# Patient Record
Sex: FEMALE | Race: WHITE | NOT HISPANIC OR LATINO
[De-identification: names, ages, dates, MRNs, and addresses within clinical notes are randomized per-mention and may not be internally consistent; named-entity substitution may affect disease eponyms.]

---

## 2017-07-17 ENCOUNTER — RX RENEWAL (OUTPATIENT)
Age: 67
End: 2017-07-17

## 2017-11-28 ENCOUNTER — MEDICATION RENEWAL (OUTPATIENT)
Age: 67
End: 2017-11-28

## 2018-01-10 ENCOUNTER — NON-APPOINTMENT (OUTPATIENT)
Age: 68
End: 2018-01-10

## 2018-01-10 ENCOUNTER — APPOINTMENT (OUTPATIENT)
Dept: CARDIOLOGY | Facility: CLINIC | Age: 68
End: 2018-01-10
Payer: MEDICARE

## 2018-01-10 VITALS
HEIGHT: 62 IN | SYSTOLIC BLOOD PRESSURE: 130 MMHG | WEIGHT: 165 LBS | BODY MASS INDEX: 30.36 KG/M2 | DIASTOLIC BLOOD PRESSURE: 74 MMHG | HEART RATE: 96 BPM

## 2018-01-10 DIAGNOSIS — Z82.49 FAMILY HISTORY OF ISCHEMIC HEART DISEASE AND OTHER DISEASES OF THE CIRCULATORY SYSTEM: ICD-10-CM

## 2018-01-10 PROCEDURE — 99213 OFFICE O/P EST LOW 20 MIN: CPT

## 2018-01-10 PROCEDURE — 93000 ELECTROCARDIOGRAM COMPLETE: CPT

## 2018-01-12 LAB
25(OH)D3 SERPL-MCNC: 34.9 NG/ML
BASOPHILS # BLD AUTO: 0.02 K/UL
BASOPHILS NFR BLD AUTO: 0.3 %
EOSINOPHIL # BLD AUTO: 0.13 K/UL
EOSINOPHIL NFR BLD AUTO: 1.9 %
HBA1C MFR BLD HPLC: 5.8 %
HCT VFR BLD CALC: 44 %
HGB BLD-MCNC: 13.7 G/DL
IMM GRANULOCYTES NFR BLD AUTO: 0.1 %
LYMPHOCYTES # BLD AUTO: 1.99 K/UL
LYMPHOCYTES NFR BLD AUTO: 28.8 %
MAN DIFF?: NORMAL
MCHC RBC-ENTMCNC: 26.7 PG
MCHC RBC-ENTMCNC: 31.1 GM/DL
MCV RBC AUTO: 85.8 FL
MONOCYTES # BLD AUTO: 0.51 K/UL
MONOCYTES NFR BLD AUTO: 7.4 %
NEUTROPHILS # BLD AUTO: 4.24 K/UL
NEUTROPHILS NFR BLD AUTO: 61.5 %
PLATELET # BLD AUTO: 180 K/UL
RBC # BLD: 5.13 M/UL
RBC # FLD: 14.8 %
T3FREE SERPL-MCNC: 3.19 PG/ML
T4 FREE SERPL-MCNC: 1.2 NG/DL
TSH SERPL-ACNC: 1.54 UIU/ML
WBC # FLD AUTO: 6.9 K/UL

## 2018-01-16 ENCOUNTER — OTHER (OUTPATIENT)
Age: 68
End: 2018-01-16

## 2018-01-19 ENCOUNTER — APPOINTMENT (OUTPATIENT)
Dept: CARDIOLOGY | Facility: CLINIC | Age: 68
End: 2018-01-19

## 2018-01-22 ENCOUNTER — MEDICATION RENEWAL (OUTPATIENT)
Age: 68
End: 2018-01-22

## 2018-01-22 LAB
ALBUMIN SERPL ELPH-MCNC: 4.5 G/DL
ALP BLD-CCNC: 104 U/L
ALT SERPL-CCNC: 25 U/L
ANION GAP SERPL CALC-SCNC: 14 MMOL/L
AST SERPL-CCNC: 28 U/L
BILIRUB SERPL-MCNC: 0.5 MG/DL
BUN SERPL-MCNC: 16 MG/DL
CALCIUM SERPL-MCNC: 10.7 MG/DL
CHLORIDE SERPL-SCNC: 105 MMOL/L
CHOLEST SERPL-MCNC: 203 MG/DL
CHOLEST/HDLC SERPL: 3.7 RATIO
CO2 SERPL-SCNC: 24 MMOL/L
CREAT SERPL-MCNC: 0.9 MG/DL
CRP SERPL HS-MCNC: 0.6 MG/L
GLUCOSE SERPL-MCNC: 103 MG/DL
HDLC SERPL-MCNC: 55 MG/DL
LDLC SERPL CALC-MCNC: 101 MG/DL
MAGNESIUM SERPL-MCNC: 2.4 MG/DL
POTASSIUM SERPL-SCNC: 4.6 MMOL/L
PROT SERPL-MCNC: 7.5 G/DL
SODIUM SERPL-SCNC: 143 MMOL/L
TRIGL SERPL-MCNC: 237 MG/DL

## 2018-06-07 ENCOUNTER — EMERGENCY (EMERGENCY)
Facility: HOSPITAL | Age: 68
LOS: 1 days | Discharge: HOME | End: 2018-06-07
Attending: EMERGENCY MEDICINE | Admitting: INTERNAL MEDICINE
Payer: MEDICARE

## 2018-06-07 VITALS
TEMPERATURE: 98 F | WEIGHT: 154.98 LBS | DIASTOLIC BLOOD PRESSURE: 103 MMHG | HEIGHT: 62 IN | RESPIRATION RATE: 20 BRPM | SYSTOLIC BLOOD PRESSURE: 159 MMHG | OXYGEN SATURATION: 99 % | HEART RATE: 111 BPM

## 2018-06-07 DIAGNOSIS — E78.5 HYPERLIPIDEMIA, UNSPECIFIED: ICD-10-CM

## 2018-06-07 DIAGNOSIS — R00.0 TACHYCARDIA, UNSPECIFIED: ICD-10-CM

## 2018-06-07 DIAGNOSIS — G45.4 TRANSIENT GLOBAL AMNESIA: ICD-10-CM

## 2018-06-07 DIAGNOSIS — R41.0 DISORIENTATION, UNSPECIFIED: ICD-10-CM

## 2018-06-07 LAB
ALBUMIN SERPL ELPH-MCNC: 4.1 G/DL — SIGNIFICANT CHANGE UP (ref 3.3–5)
ALP SERPL-CCNC: 111 U/L — SIGNIFICANT CHANGE UP (ref 40–120)
ALT FLD-CCNC: 31 U/L — SIGNIFICANT CHANGE UP (ref 12–78)
ANION GAP SERPL CALC-SCNC: 12 MMOL/L — SIGNIFICANT CHANGE UP (ref 5–17)
APPEARANCE UR: CLEAR — SIGNIFICANT CHANGE UP
APTT BLD: 29.1 SEC — SIGNIFICANT CHANGE UP (ref 27.5–37.4)
AST SERPL-CCNC: 26 U/L — SIGNIFICANT CHANGE UP (ref 15–37)
BASOPHILS # BLD AUTO: 0.05 K/UL — SIGNIFICANT CHANGE UP (ref 0–0.2)
BASOPHILS NFR BLD AUTO: 0.5 % — SIGNIFICANT CHANGE UP (ref 0–2)
BILIRUB SERPL-MCNC: 0.4 MG/DL — SIGNIFICANT CHANGE UP (ref 0.2–1.2)
BILIRUB UR-MCNC: NEGATIVE — SIGNIFICANT CHANGE UP
BUN SERPL-MCNC: 14 MG/DL — SIGNIFICANT CHANGE UP (ref 7–23)
CALCIUM SERPL-MCNC: 10.2 MG/DL — HIGH (ref 8.5–10.1)
CHLORIDE SERPL-SCNC: 103 MMOL/L — SIGNIFICANT CHANGE UP (ref 96–108)
CK MB CFR SERPL CALC: 1.6 NG/ML — SIGNIFICANT CHANGE UP (ref 0.5–3.6)
CO2 SERPL-SCNC: 24 MMOL/L — SIGNIFICANT CHANGE UP (ref 22–31)
COLOR SPEC: YELLOW — SIGNIFICANT CHANGE UP
CREAT SERPL-MCNC: 0.91 MG/DL — SIGNIFICANT CHANGE UP (ref 0.5–1.3)
DIFF PNL FLD: ABNORMAL
EOSINOPHIL # BLD AUTO: 0.11 K/UL — SIGNIFICANT CHANGE UP (ref 0–0.5)
EOSINOPHIL NFR BLD AUTO: 1.1 % — SIGNIFICANT CHANGE UP (ref 0–6)
ETHANOL SERPL-MCNC: <10 MG/DL — SIGNIFICANT CHANGE UP (ref 0–10)
GLUCOSE BLDC GLUCOMTR-MCNC: 108 MG/DL — HIGH (ref 70–99)
GLUCOSE SERPL-MCNC: 96 MG/DL — SIGNIFICANT CHANGE UP (ref 70–99)
GLUCOSE UR QL: NEGATIVE MG/DL — SIGNIFICANT CHANGE UP
HCT VFR BLD CALC: 45.4 % — HIGH (ref 34.5–45)
HGB BLD-MCNC: 14.4 G/DL — SIGNIFICANT CHANGE UP (ref 11.5–15.5)
IMM GRANULOCYTES NFR BLD AUTO: 0.3 % — SIGNIFICANT CHANGE UP (ref 0–1.5)
INR BLD: 0.94 RATIO — SIGNIFICANT CHANGE UP (ref 0.88–1.16)
KETONES UR-MCNC: NEGATIVE — SIGNIFICANT CHANGE UP
LEUKOCYTE ESTERASE UR-ACNC: ABNORMAL
LYMPHOCYTES # BLD AUTO: 2.22 K/UL — SIGNIFICANT CHANGE UP (ref 1–3.3)
LYMPHOCYTES # BLD AUTO: 21.5 % — SIGNIFICANT CHANGE UP (ref 13–44)
MCHC RBC-ENTMCNC: 26.3 PG — LOW (ref 27–34)
MCHC RBC-ENTMCNC: 31.7 GM/DL — LOW (ref 32–36)
MCV RBC AUTO: 82.8 FL — SIGNIFICANT CHANGE UP (ref 80–100)
MONOCYTES # BLD AUTO: 0.67 K/UL — SIGNIFICANT CHANGE UP (ref 0–0.9)
MONOCYTES NFR BLD AUTO: 6.5 % — SIGNIFICANT CHANGE UP (ref 2–14)
NEUTROPHILS # BLD AUTO: 7.23 K/UL — SIGNIFICANT CHANGE UP (ref 1.8–7.4)
NEUTROPHILS NFR BLD AUTO: 70.1 % — SIGNIFICANT CHANGE UP (ref 43–77)
NITRITE UR-MCNC: NEGATIVE — SIGNIFICANT CHANGE UP
PCP SPEC-MCNC: SIGNIFICANT CHANGE UP
PH UR: 8 — SIGNIFICANT CHANGE UP (ref 5–8)
PLATELET # BLD AUTO: 242 K/UL — SIGNIFICANT CHANGE UP (ref 150–400)
POTASSIUM SERPL-MCNC: 4.3 MMOL/L — SIGNIFICANT CHANGE UP (ref 3.5–5.3)
POTASSIUM SERPL-SCNC: 4.3 MMOL/L — SIGNIFICANT CHANGE UP (ref 3.5–5.3)
PROT SERPL-MCNC: 8.3 GM/DL — SIGNIFICANT CHANGE UP (ref 6–8.3)
PROT UR-MCNC: NEGATIVE MG/DL — SIGNIFICANT CHANGE UP
PROTHROM AB SERPL-ACNC: 10.2 SEC — SIGNIFICANT CHANGE UP (ref 9.8–12.7)
RBC # BLD: 5.48 M/UL — HIGH (ref 3.8–5.2)
RBC # FLD: 14.4 % — SIGNIFICANT CHANGE UP (ref 10.3–14.5)
SODIUM SERPL-SCNC: 139 MMOL/L — SIGNIFICANT CHANGE UP (ref 135–145)
SP GR SPEC: 1.01 — SIGNIFICANT CHANGE UP (ref 1.01–1.02)
TROPONIN I SERPL-MCNC: <.015 NG/ML — SIGNIFICANT CHANGE UP (ref 0.01–0.04)
UROBILINOGEN FLD QL: NEGATIVE MG/DL — SIGNIFICANT CHANGE UP
WBC # BLD: 10.31 K/UL — SIGNIFICANT CHANGE UP (ref 3.8–10.5)
WBC # FLD AUTO: 10.31 K/UL — SIGNIFICANT CHANGE UP (ref 3.8–10.5)

## 2018-06-07 PROCEDURE — 70450 CT HEAD/BRAIN W/O DYE: CPT | Mod: 26

## 2018-06-07 PROCEDURE — 93880 EXTRACRANIAL BILAT STUDY: CPT | Mod: 26

## 2018-06-07 PROCEDURE — G0426: CPT

## 2018-06-07 PROCEDURE — 99285 EMERGENCY DEPT VISIT HI MDM: CPT

## 2018-06-07 PROCEDURE — 71045 X-RAY EXAM CHEST 1 VIEW: CPT | Mod: 26

## 2018-06-07 PROCEDURE — 99283 EMERGENCY DEPT VISIT LOW MDM: CPT

## 2018-06-07 RX ORDER — METOPROLOL TARTRATE 50 MG
25 TABLET ORAL
Qty: 0 | Refills: 0 | Status: DISCONTINUED | OUTPATIENT
Start: 2018-06-07 | End: 2018-06-08

## 2018-06-07 RX ORDER — ATORVASTATIN CALCIUM 80 MG/1
80 TABLET, FILM COATED ORAL AT BEDTIME
Qty: 0 | Refills: 0 | Status: DISCONTINUED | OUTPATIENT
Start: 2018-06-07 | End: 2018-06-08

## 2018-06-07 RX ORDER — ASPIRIN/CALCIUM CARB/MAGNESIUM 324 MG
81 TABLET ORAL DAILY
Qty: 0 | Refills: 0 | Status: DISCONTINUED | OUTPATIENT
Start: 2018-06-07 | End: 2018-06-08

## 2018-06-07 RX ORDER — FLUTICASONE PROPIONATE 50 MCG
1 SPRAY, SUSPENSION NASAL
Qty: 0 | Refills: 0 | Status: DISCONTINUED | OUTPATIENT
Start: 2018-06-07 | End: 2018-06-08

## 2018-06-07 RX ADMIN — Medication 25 MILLIGRAM(S): at 22:11

## 2018-06-07 RX ADMIN — Medication 81 MILLIGRAM(S): at 23:50

## 2018-06-07 RX ADMIN — Medication 1 SPRAY(S): at 22:08

## 2018-06-07 RX ADMIN — ATORVASTATIN CALCIUM 80 MILLIGRAM(S): 80 TABLET, FILM COATED ORAL at 23:50

## 2018-06-07 NOTE — H&P ADULT - ASSESSMENT
64F WITH hyperlipidemia with transient global amnesia     IMPROVE VTE Individual Risk Assessment        RISK                                                          Points  [  ] Previous VTE                                                3  [  ] Thrombophilia                                             2  [  ] Lower limb paralysis                                   2        (unable to hold up >15 seconds)    [  ] Current Cancer                                            2         (within 6 months)  [  ] Immobilization > 24 hrs                              1  [  ] ICU/CCU stay > 24 hours                            1  [  ] Age > 60                                                    1  IMPROVE VTE Score _1________

## 2018-06-07 NOTE — H&P ADULT - NSHPLABSRESULTS_GEN_ALL_CORE
14.4   10.31 )-----------( 242      ( 07 Jun 2018 17:31 )             45.4   06-07    139  |  103  |  14  ----------------------------<  96  4.3   |  24  |  0.91    Ca    10.2<H>      07 Jun 2018 17:31    TPro  8.3  /  Alb  4.1  /  TBili  0.4  /  DBili  x   /  AST  26  /  ALT  31  /  AlkPhos  111  06-07  < from: CT Brain Stroke Protocol (06.07.18 @ 17:14) >    Results were discussed with Dr. Thomas at 5:20 PM on June 7, 2018.  No acute intracranial hemorrhage or acute territorial infarct.

## 2018-06-07 NOTE — H&P ADULT - NSHPPHYSICALEXAM_GEN_ALL_CORE
GENERAL: NAD well-developed  HEAD:  Atraumatic, Normocephalic  EYES: EOMI, PERRLA, conjunctiva and sclera clear  ENMT: No tonsillar erythema, exudates, or enlargement; Moist mucous membranes, Good dentition, No lesions  NECK: Supple, No JVD, Normal thyroid  NERVOUS SYSTEM:  Alert & Oriented X3, Good concentration; Motor Strength 5/5 B/L upper and lower extremities; DTRs 2+ intact and symmetric  CHEST/LUNG: Clear to percussion bilaterally; No rales, rhonchi, wheezing, or rubs  HEART: Regular rate and rhythm; No murmurs, rubs, or gallops  ABDOMEN: Soft, Nontender, Nondistended; Bowel sounds present  EXTREMITIES:  2+ Peripheral Pulses, No clubbing, cyanosis, or edema  LYMPH: No lymphadenopathy   SKIN: No rashes or lesions GENERAL: NAD well-developed  HEAD:  Atraumatic, Normocephalic  EYES: EOMI, PERRLA, conjunctiva and sclera clear  ENMT: No tonsillar erythema, exudates, or enlargement; Moist mucous membranes, Good dentition, No lesions  NECK: Supple, No JVD, Normal thyroid  NERVOUS SYSTEM:  Alert & Oriented X2, Good concentration; Motor Strength 5/5 B/L upper and lower extremities; DTRs 2+ intact and symmetric DID NOT KNOW PRESIDENT OR YEAR BUT KNEW DISTANT MEMORY AND MATH   CHEST/LUNG: Clear to percussion bilaterally; No rales, rhonchi, wheezing, or rubs  HEART: Regular rate and rhythm; No murmurs, rubs, or gallops  ABDOMEN: Soft, Nontender, Nondistended; Bowel sounds present  EXTREMITIES:  2+ Peripheral Pulses, No clubbing, cyanosis, or edema  LYMPH: No lymphadenopathy   SKIN: No rashes or lesions

## 2018-06-07 NOTE — ED PROVIDER NOTE - MEDICAL DECISION MAKING DETAILS
sudden disorientation/confusion, answering many questions right, but disoriented to time questions. otherwise appropriate and joking, fs wnl, no other neuro symptoms. stroke code called given sudden onset and some unclear neuro symptoms, ct brain, neuro eval, labs.

## 2018-06-07 NOTE — ED ADULT NURSE NOTE - OBJECTIVE STATEMENT
Pt A&Ox 2, Per family pt became suddenly confused while trimming trees in the backward around 3 p.m. Pt denies any chest pain or headache while in ED. Pt moving all extremities no drifting or weakness noted. No slurred speech or facial droop noted while in ED. Pt evaluated by Dr. NOA Bullock via telestroke

## 2018-06-07 NOTE — CONSULT NOTE ADULT - ASSESSMENT
Historian:     Patient and .  HPI:    LAURA MORA ..56 y/o female hx hld biba for sudden onset confusion starting ~230-2:45p. Seen normal at 2:30, seen again by  around 2:45p and pt kept repeating self, didn't know the year, or what was happening in some situations. Per ems, pt wasn't able to answer questions correctly at all, including orientation to place/person, but has improved somewhat. Fs on scene 105, no trauma, no headache, no weakness/tingling/slurred speech, no a/c. Denies recent illness, cp, sob, abd pain or any other symptoms. (2018 18:29).   stated that she could not remember an event this morning she was trimming hedges and still cannot recall she did up to the present.    PAST MEDICAL & SURGICAL HISTORY:   hyperlipidemia; No significant past surgical history    MEDICATIONS  (STANDING):  aspirin enteric coated 81 milliGRAM(s) Oral daily  atorvastatin 80 milliGRAM(s) Oral at bedtime  fluticasone propionate 50 MICROgram(s)/spray Nasal Spray 1 Spray(s) Both Nostrils two times a day  metoprolol tartrate 25 milliGRAM(s) Oral two times a day    MEDICATIONS  (PRN):  Allergies: No Known Allergies  Intolerances  FAMILY HISTORY:  Family history of cerebrovascular accident (CVA) (Father)    Vital Signs Last 24 Hrs  T(C): 37 (2018 21:14), Max: 37.1 (2018 19:56)  T(F): 98.6 (2018 21:14), Max: 98.7 (2018 19:56)  HR: 116 (2018 21:14) (111 - 126)  BP: 135/89 (2018 21:14) (135/89 - 159/103)  BP(mean): --  RR: 18 (2018 21:14) (18 - 20)  SpO2: 97% (2018 21:14) (97% - 99%)    NEUROLOGICAL EXAM:  HENT:  Normocephalic head; atraumatic head.  Neck supple.  ENT: normal looking.  Mental State:    Alert.  Fully oriented to person, place and date.  Coherent.  Speech clear and intact.  Cooperative.  Responds appropriately.  Still unable to recall what she did at home (trimming the hedges in the garden).    Cranial Nerves:  II-XII:   Pupils round and reactive to light and accommodation.  Extraocular movements full.  Visual fields full (no homonymous hemianopsia).  Visual acuity wnl.  Facial symmetry intact.  Tongue midline.  Motor Functions:  Moves all extremities.  No pronator drift of UE.  Claps hands well.  Hand  intact bilaterally.      Sensory Functions:   Intact to touch and pinprick to face and extremities.  Reflexes:  Deep tendon reflexes normoactive to biceps, knees and ankles.  Babinski absent (present).  Cerebellar Testing:    Finger to nose intact.  Nystagmus absent.  Neurovascular: Carotid auscultation full without bruits.      LABS:                        14.4   10. )-----------( 242      ( 2018 17:31 )             45.4         139  |  103  |  14  ----------------------------<  96  4.3   |  24  |  0.91    Ca    10.2<H>      2018 17:31    TPro  8.3  /  Alb  4.1  /  TBili  0.4  /  DBili  x   /  AST  26  /  ALT  31  /  AlkPhos  111      PT/INR - ( 2018 17:31 )   PT: 10.2 sec;   INR: 0.94 ratio         PTT - ( 2018 17:31 )  PTT:29.1 sec    Urinalysis Basic - ( 2018 20:42 )    Color: Yellow / Appearance: Clear / S.010 / pH: x  Gluc: x / Ketone: Negative  / Bili: Negative / Urobili: Negative mg/dL   Blood: x / Protein: Negative mg/dL / Nitrite: Negative   Leuk Esterase: Trace / RBC: 0-2 /HPF / WBC 0-2   Sq Epi: x / Non Sq Epi: Occasional / Bacteria: Occasional    RADIOLOGY & ADDITIONAL STUDIES:    CT Brain Stroke Protocol: Urgent   Indication: sudden confusion, r/o stroke  Transport: Stretcher-Crib  Exam Completed  Provider's Contact #: [phone] ( @ 17:01)  POCT  Blood Glucose: 17:00 ( @ 17:03)  Complete Blood Count + Automated Diff: STAT ( @ 17:06)  Comprehensive Metabolic Panel: STAT ( @ 17:06)  Troponin I, Serum: STAT ( @ 17:06)  CKMB Mass Assay: STAT ( @ 17:06)  12 Lead ECG:   Provider's Contact #: [phone] ( @ 17:06)  Cardiac Monitor Bedside:     Time/Priority:  STAT ( @ 17:06)  Prothrombin Time and INR, Plasma:  Start Date:2018. STAT ( @ 17:06)  Activated Partial Thromboplastin Time:  Start Date:2018. STAT ( @ 17:06)  Xray Chest 1 View- PORTABLE-Urgent: Urgent   Indication: sudden disorientation  Transport: Portable  Exam Completed  Provider's Contact #: [phone] ( @ 17:06)  Type + Screen: Routine ( @ 17:06)  ABO Rh Confirmatory Specimen: Routine  Addl Info: Conditional: ABO Rh Confirmatory Specimen ( @ 17:06)  Urinalysis: STAT ( @ 17:06)  Culture - Urine: Routine  Specimen Source: Clean Catch (Midstream) ( @ 17:06)  Drug Screen W/PCP, Urine: STAT ( @ 17:15)  Alcohol, Blood: STAT  Cancel Reason: Lab Reord. ( @ 17:15)  Nucleated RBC: 17:25 ( @ 17:32)  Antibody Screen: 17:25 ( @ 17:32)  Alcohol, Blood: 17:25 ( @ 17:33)  Admit to Inpatient Level of Care.:     Service:  MEDICAL/SURGICAL    Physician:  Flavia Leblanc    Additional Instructions:  Diagnosis: Confusion  Isolation: None  Special Consideration: None ( @ 18:00)  Admit from ED ( @ 18:18)  atorvastatin: [Known as LIPITOR]  80 milliGRAM(s), Oral, at bedtime ( @ 18:30)  Admit to Inpatient Level of Care.:     Service:  Telemetry    Physician:  paul ( @ 18:45)  National Institutes [employer] Stroke Scale Score:     Time/Priority:  Routine    Additional Instructions:  Type score here ___________ ( @ 18:45)  VTE Prophylaxis - Low Risk No Prophylaxis Required:     Time/Priority:  Routine ( 18:45)  Dysphagia Screening:     Time/Priority:  Routine ( 18:45)  Supervise Meals:    Indications:    Dysphagia   Additional Instructions:  Once dysphagia screen passed, contact provider to reevaluate diet order ( 18:45)  Assess Neurological Status:     Type of Neuro Check:  Stroke    Frequency:  per routine    Additional Instructions:  Perform stroke neurological check ( 18:45)  Notify Provider For:     Additional Instructions:  Temperature GREATER THAN 38.3C (101.0F) or LESS THAN 36.0C (96.8F) ( 18:45)  Notify Provider For:     Additional Instructions:  Decline or change in neurological status ( 18:45)  Elevate - Head of Bed 30 Degrees:     Frequency:  <Continuous> ( 18:45)  Activity - Out of Bed with Assistance ( 18:45)  Fall Risk Protocol:     Time/Priority:  Routine    Additional Instructions:  Follow fall risk protocol ( 18:45)  Aspiration Precautions:     Time/Priority:  Routine ( 18:45)  Remote Telemetry/EKG EXCEPT Off Unit Tests:     Time/Priority:  Routine ( 18:45)  Education:     Other: Stroke    Additional Instructions: Distribute Stroke Education material and provide stroke education ( 18:45)  Lipid Profile: AM Sched. Collection: 2018 05:00  Addl Info: Fasting ( 18:45)  Hemoglobin A1C, Whole Blood: Routine ( 18:45)  Hemoglobin A1C with Mean Plasma Glucose: Routine ( 18:45)  Consult- Social Work, Adult ( 18:45)  Vital Signs:     Frequency:  per routine ( 18:48)  Pulse Oximetry:   Frequency: <Continuous>    Additional Instructions:  Notify Provider if oxygen saturation is LESS THAN 92% ( 18:48)  Remote Telemetry/EKG EXCEPT Off Unit Tests:     Time/Priority:  STAT ( @ 18:48)  Diet, DASH/TLC:   Sodium & Cholesterol Restricted ( 18:48)  Activity - Ambulate as Tolerated:     Time/Priority:  Routine ( @ 18:48)  MR Head No Cont: Routine   Indication: amnesia  Transport: Stretcher-Crib  Provider's Contact #: [phone] ( @ 18:50)  TTE Echo Doppler w/o Cont:   Transport: Stretcher-Crib  Monitor: w/o Monitor  Provider's Contact #: [phone] ( @ 18:50)  US Duplex Carotid Arteries Complete, Bilateral: Routine   Indication: cva  Transport: Stretcher-Crib  Exam Completed  Provider's Contact #: [phone] ( @ 18:50)  aspirin enteric coated: [Known as Ecotrin]  81 milliGRAM(s), Oral, daily  Administration Instructions: swallow whole * don't crush/chew  Provider's Contact #: [phone] ( @ 18:50)  Triiodothyronine, Total (T3 Total): AM Sched. Collection: 2018 05:00 ( @ 18:50)  T4, Serum: AM Sched. Collection: 2018 05:00 ( @ 18:50)  Thyroid Stimulating Hormone, Serum: AM Sched. Collection: 2018 05:00 ( @ 18:50)  metoprolol tartrate: [Ordered as LOPRESSOR]  25 milliGRAM(s), Oral, two times a day  Special Instructions: hsbp<110 p<60  Provider's Contact #: [phone] ( @ 18:51)  EEG Awake or Drowsy:   Transport: Stretcher-Crib  Hemorrhage:No  Brain Death: No  MI:No  Sickle Cell:No   Stimulants:No  Anti-Convulsants:No   Anti-Depressants:No  Contr Substances:No   Ordering Provider's Pager/Contact #[phone] ( @ 19:00)  Benzodiazepine, Urine: 19:00 ( @ 20:42)  THC, Urine Qualitative: 19:00 ( @ 20:42)  Cocaine Metabolite, Urine: 19:00 ( @ 20:42)  Amphetamine, Urine: 19:00 ( @ 20:42)  Opiate, Urine: 19:00 ( @ 20:42)  Barbiturates Screen, Urine: 19:00 ( @ 20:42)  Phencyclidine Level, Urine: 19:00 ( @ 20:42)  Methadone, Urine: 19:00 ( @ 20:43)  Urine Microscopic-Add On (NC): 19:00 ( @ 20:54)  Diet, DASH/TLC:   Sodium & Cholesterol Restricted  Lacto-Ovo Veg (Accepts Milk Prod., Eggs) ( @ 21:18)  fluticasone propionate 50 MICROgram(s)/spray Nasal Spray: [Ordered as FLONASE]  1 Spray(s), Both Nostrils, two times a day  Provider's Contact #: [phone] ( @ 21:18)    Assessment & Opinion:  Transient Global Amnesia.    Recommendations:  Brain MRI.  Carotid doppler.  Echocardiogram.  EEG.   DVT prophylaxis as ordered.  Medications:  Continue all medications

## 2018-06-07 NOTE — ED PROVIDER NOTE - OBJECTIVE STATEMENT
66 y/o female hx hld biba for sudden onset confusion starting ~230-2:45p. Seen normal at 2:30, seen again by  around 2:45p and pt kept repeating self, didn't know the year, or what was happening in some situations. Per ems, pt wasn't able to answer questions correctly at all, including orientation to place/person, but has improved somewhat. Fs on scene 105, no trauma, no headache, no weakness/tingling/slurred speech, no a/c. Denies recent illness, cp, sob, abd pain or any other symptoms.    ROS: No fever/chills. No eye pain/changes in vision, No ear pain/sore throat/dysphagia, No chest pain/palpitations. No SOB/cough/. No abdominal pain, N/V/D, no black/bloody bm. No dysuria/frequency/discharge, No headache. No Dizziness.    No rashes or breaks in skin. No numbness/tingling/weakness.

## 2018-06-07 NOTE — ED ADULT TRIAGE NOTE - CHIEF COMPLAINT QUOTE
pt BIBA for increased confusion starting at 3pm.  states pt keeps asking the same questions over and over again. as per  last normal 1430. history of dm

## 2018-06-07 NOTE — ED PROVIDER NOTE - PROGRESS NOTE DETAILS
Martha: pt seen by telestroke neurologist, no tpa, feels likely transient global amnesia. family bedside, discussion with stroke attending, family and pt, will admit for mri, observation, hydration. no other deficits.

## 2018-06-07 NOTE — H&P ADULT - HISTORY OF PRESENT ILLNESS
58 y/o female hx hld biba for sudden onset confusion starting ~230-2:45p. Seen normal at 2:30, seen again by  around 2:45p and pt kept repeating self, didn't know the year, or what was happening in some situations. Per ems, pt wasn't able to answer questions correctly at all, including orientation to place/person, but has improved somewhat. Fs on scene 105, no trauma, no headache, no weakness/tingling/slurred speech, no a/c. Denies recent illness, cp, sob, abd pain or any other symptoms.

## 2018-06-07 NOTE — PATIENT PROFILE ADULT. - VISION (WITH CORRECTIVE LENSES IF THE PATIENT USUALLY WEARS THEM):
Normal vision: sees adequately in most situations; can see medication labels, newsprint/distance at home

## 2018-06-07 NOTE — H&P ADULT - FAMILY HISTORY
no nausea/no hematuria/no chills/no diarrhea Father  Still living? Unknown  Family history of cerebrovascular accident (CVA), Age at diagnosis: Age Unknown

## 2018-06-07 NOTE — ED PROVIDER NOTE - PHYSICAL EXAMINATION
NIHSS: 2 (month and age wrong)  Gen: No acute distress, non toxic  HEENT: Mucous membranes moist, pink conjunctivae, EOMI  CV: RRR, nl s1/s2.  Resp: CTAB, normal rate and effort  GI: Abdomen soft, NT, ND. No rebound, no guarding  : No CVAT  Neuro: A&O x 2, moving all 4 extremities. 5/5 strength b/l UE and LE, normal sensation. no facial droop, normal speech. normal visual fields.   MSK: No spine or joint tenderness to palpation  Skin: No rashes. intact and perfused.

## 2018-06-08 ENCOUNTER — TRANSCRIPTION ENCOUNTER (OUTPATIENT)
Age: 68
End: 2018-06-08

## 2018-06-08 VITALS
DIASTOLIC BLOOD PRESSURE: 86 MMHG | TEMPERATURE: 98 F | OXYGEN SATURATION: 97 % | HEART RATE: 91 BPM | SYSTOLIC BLOOD PRESSURE: 119 MMHG | RESPIRATION RATE: 16 BRPM

## 2018-06-08 LAB
CHOLEST SERPL-MCNC: 204 MG/DL — HIGH (ref 10–199)
CULTURE RESULTS: NO GROWTH — SIGNIFICANT CHANGE UP
ESTIMATED AVERAGE GLUCOSE: 128 MG/DL — HIGH (ref 68–114)
HBA1C BLD-MCNC: 6.1 % — HIGH (ref 4–5.6)
HBA1C BLD-MCNC: 6.1 % — HIGH (ref 4–5.6)
HDLC SERPL-MCNC: 53 MG/DL — SIGNIFICANT CHANGE UP (ref 40–125)
LIPID PNL WITH DIRECT LDL SERPL: 112 MG/DL — SIGNIFICANT CHANGE UP
SPECIMEN SOURCE: SIGNIFICANT CHANGE UP
T3 SERPL-MCNC: 126 NG/DL — SIGNIFICANT CHANGE UP (ref 80–200)
T4 AB SER-ACNC: 7.5 UG/DL — SIGNIFICANT CHANGE UP (ref 4.6–12)
TOTAL CHOLESTEROL/HDL RATIO MEASUREMENT: 3.8 RATIO — SIGNIFICANT CHANGE UP (ref 3.3–7.1)
TRIGL SERPL-MCNC: 195 MG/DL — HIGH (ref 10–149)
TSH SERPL-MCNC: 1.52 UU/ML — SIGNIFICANT CHANGE UP (ref 0.36–3.74)

## 2018-06-08 PROCEDURE — 99239 HOSP IP/OBS DSCHRG MGMT >30: CPT

## 2018-06-08 PROCEDURE — 70551 MRI BRAIN STEM W/O DYE: CPT | Mod: 26

## 2018-06-08 PROCEDURE — 93306 TTE W/DOPPLER COMPLETE: CPT | Mod: 26

## 2018-06-08 RX ORDER — ALPRAZOLAM 0.25 MG
0.25 TABLET ORAL ONCE
Qty: 0 | Refills: 0 | Status: DISCONTINUED | OUTPATIENT
Start: 2018-06-08 | End: 2018-06-08

## 2018-06-08 RX ORDER — OXYMETAZOLINE HYDROCHLORIDE 0.5 MG/ML
1 SPRAY NASAL ONCE
Qty: 0 | Refills: 0 | Status: COMPLETED | OUTPATIENT
Start: 2018-06-08 | End: 2018-06-08

## 2018-06-08 RX ADMIN — OXYMETAZOLINE HYDROCHLORIDE 1 SPRAY(S): 0.5 SPRAY NASAL at 01:34

## 2018-06-08 RX ADMIN — Medication 81 MILLIGRAM(S): at 11:17

## 2018-06-08 RX ADMIN — Medication 25 MILLIGRAM(S): at 05:51

## 2018-06-08 NOTE — DISCHARGE NOTE ADULT - PATIENT PORTAL LINK FT
You can access the [facility] Patient Portal, offered by [facility], by registering with the following website: [email or web address]

## 2018-06-08 NOTE — DISCHARGE NOTE ADULT - CARE PROVIDER_API CALL
Biju Puentes (MD), Neurology  [address] [place] [address]  Phone: [phone]  Fax: [phone]

## 2018-06-08 NOTE — DISCHARGE NOTE ADULT - HOSPITAL COURSE
56 y/o female hx hld biba for sudden onset confusion starting ~230-2:45p. Seen normal at 2:30, seen again by  around 2:45p and pt kept repeating self, didn't know the year, or what was happening in some situations. Per ems, pt wasn't able to answer questions correctly at all, including orientation to place/person, but has improved somewhat. Fs on scene 105, no trauma, no headache, no weakness/tingling/slurred speech, no a/c. Denies recent illness, cp, sob, abd pain or any other symptoms.     Problem/Plan - 1:  ·  Problem: Transient global amnesia.  Plan: mri normal all symptoms resolved eeg/tte results as outpt  neurology outpt f/u       45min spent on dc planning

## 2018-06-08 NOTE — DISCHARGE NOTE ADULT - MEDICATION SUMMARY - MEDICATIONS TO TAKE
I will START or STAY ON the medications listed below when I get home from the hospital:    Crestor 20 mg oral tablet  -- 1 tab(s) by mouth once a day (at bedtime)  -- Indication: For Dyslipidemia

## 2018-06-08 NOTE — DISCHARGE NOTE ADULT - CARE PLAN
Principal Discharge DX:	Transient global amnesia  Goal:	f/u with neurology  Assessment and plan of treatment:	stay hydrated  Secondary Diagnosis:	Dyslipidemia

## 2018-06-28 ENCOUNTER — APPOINTMENT (OUTPATIENT)
Dept: CARDIOLOGY | Facility: CLINIC | Age: 68
End: 2018-06-28
Payer: MEDICARE

## 2018-06-28 VITALS
BODY MASS INDEX: 30.73 KG/M2 | OXYGEN SATURATION: 98 % | SYSTOLIC BLOOD PRESSURE: 130 MMHG | DIASTOLIC BLOOD PRESSURE: 70 MMHG | WEIGHT: 167 LBS | HEIGHT: 62 IN | HEART RATE: 105 BPM

## 2018-06-28 PROCEDURE — 93000 ELECTROCARDIOGRAM COMPLETE: CPT

## 2018-06-28 PROCEDURE — 99215 OFFICE O/P EST HI 40 MIN: CPT

## 2018-06-28 RX ORDER — DESONIDE 0.5 MG/G
0.05 OINTMENT TOPICAL
Qty: 15 | Refills: 0 | Status: DISCONTINUED | COMMUNITY
Start: 2017-07-18 | End: 2018-06-28

## 2018-07-05 ENCOUNTER — TRANSCRIPTION ENCOUNTER (OUTPATIENT)
Age: 68
End: 2018-07-05

## 2018-07-05 NOTE — PHARMACY COMMUNICATION NOTE - REASON FOR NOTE
lvm to call office I need to know if she is still having symptoms. Also, if so she needs to come in for a nurse visit urine check.  There is no co pay for a nurse visit s/w pa, pt takes at home and wants it now,

## 2018-09-20 ENCOUNTER — RX RENEWAL (OUTPATIENT)
Age: 68
End: 2018-09-20

## 2019-01-09 ENCOUNTER — APPOINTMENT (OUTPATIENT)
Dept: CARDIOLOGY | Facility: CLINIC | Age: 69
End: 2019-01-09

## 2019-02-21 ENCOUNTER — MEDICATION RENEWAL (OUTPATIENT)
Age: 69
End: 2019-02-21

## 2019-03-07 ENCOUNTER — APPOINTMENT (OUTPATIENT)
Dept: CARDIOLOGY | Facility: CLINIC | Age: 69
End: 2019-03-07
Payer: MEDICARE

## 2019-03-07 ENCOUNTER — NON-APPOINTMENT (OUTPATIENT)
Age: 69
End: 2019-03-07

## 2019-03-07 VITALS
SYSTOLIC BLOOD PRESSURE: 120 MMHG | HEIGHT: 62 IN | BODY MASS INDEX: 31.1 KG/M2 | WEIGHT: 169 LBS | DIASTOLIC BLOOD PRESSURE: 82 MMHG

## 2019-03-07 PROCEDURE — 93000 ELECTROCARDIOGRAM COMPLETE: CPT

## 2019-03-07 PROCEDURE — 99214 OFFICE O/P EST MOD 30 MIN: CPT

## 2019-03-07 NOTE — PHYSICAL EXAM
[General Appearance - Well Developed] : well developed [Normal Appearance] : normal appearance [Well Groomed] : well groomed [General Appearance - Well Nourished] : well nourished [No Deformities] : no deformities [General Appearance - In No Acute Distress] : no acute distress [Normal Conjunctiva] : the conjunctiva exhibited no abnormalities [Eyelids - No Xanthelasma] : the eyelids demonstrated no xanthelasmas [Normal Oral Mucosa] : normal oral mucosa [No Oral Pallor] : no oral pallor [No Oral Cyanosis] : no oral cyanosis [Normal Jugular Venous A Waves Present] : normal jugular venous A waves present [Normal Jugular Venous V Waves Present] : normal jugular venous V waves present [No Jugular Venous Hector A Waves] : no jugular venous hector A waves [Respiration, Rhythm And Depth] : normal respiratory rhythm and effort [Exaggerated Use Of Accessory Muscles For Inspiration] : no accessory muscle use [Auscultation Breath Sounds / Voice Sounds] : lungs were clear to auscultation bilaterally [Heart Rate And Rhythm] : heart rate and rhythm were normal [Heart Sounds] : normal S1 and S2 [Murmurs] : no murmurs present [Abdomen Soft] : soft [Abdomen Tenderness] : non-tender [Abdomen Mass (___ Cm)] : no abdominal mass palpated [Abnormal Walk] : normal gait [Gait - Sufficient For Exercise Testing] : the gait was sufficient for exercise testing [Nail Clubbing] : no clubbing of the fingernails [Cyanosis, Localized] : no localized cyanosis [Petechial Hemorrhages (___cm)] : no petechial hemorrhages [Skin Color & Pigmentation] : normal skin color and pigmentation [] : no rash [No Venous Stasis] : no venous stasis [Skin Lesions] : no skin lesions [No Skin Ulcers] : no skin ulcer [No Xanthoma] : no  xanthoma was observed [Oriented To Time, Place, And Person] : oriented to person, place, and time [Affect] : the affect was normal [Mood] : the mood was normal [No Anxiety] : not feeling anxious

## 2019-03-07 NOTE — REASON FOR VISIT
[Follow-Up - Clinic] : a clinic follow-up of [Hyperlipidemia] : hyperlipidemia [FreeTextEntry1] : Patient hospitalized in June, 2018 with episode of "transient global amnesia ".  Patient had selective amnesia lasted several hours, was seen in the emergency room without any evidence of acute stroke. Patient has been asymptomatic ever since. Evaluation in hospital demonstrated normal MRI and echocardiogram, mild to moderate left internal carotid artery stenosis was noted. She has had no interval complaints of chest pain, shortness of breath or palpitations and denies any fatigue, nausea, weight loss, focal neurological deficits. There have been no interval changes in her medications. She is a vegetarian and takes supplements for diet.\par \par Seen by Dr. Andino for TGA, no evidence on MRI of stroke, EEG unremarkable, no careotid stenosis.

## 2019-03-07 NOTE — DISCUSSION/SUMMARY
[Hyperlipidemia] : hyperlipidemia [Stable] : stable [Lipids Test Panel] : a fasting lipid profile [None] : none [___ Month(s)] : [unfilled] month(s) [FreeTextEntry1] : Patient with TGA but no evidence of CVA.\par \par Repeat labs, reassess hyperlipidemia.\par A heart healthy diet and lifestyle was recommended including low-fat, low-cholesterol, low-salt foods with proper weight maintenance and better carbohydrate choices.\par \par

## 2019-03-26 LAB
25(OH)D3 SERPL-MCNC: 30.5 NG/ML
ALBUMIN SERPL ELPH-MCNC: 4.9 G/DL
ALP BLD-CCNC: 121 U/L
ALT SERPL-CCNC: 28 U/L
ANION GAP SERPL CALC-SCNC: 20 MMOL/L
AST SERPL-CCNC: 26 U/L
BASOPHILS # BLD AUTO: 0.04 K/UL
BASOPHILS NFR BLD AUTO: 0.6 %
BILIRUB SERPL-MCNC: 0.3 MG/DL
BUN SERPL-MCNC: 15 MG/DL
CALCIUM SERPL-MCNC: 10.9 MG/DL
CHLORIDE SERPL-SCNC: 104 MMOL/L
CHOLEST SERPL-MCNC: 215 MG/DL
CHOLEST/HDLC SERPL: 4.2 RATIO
CO2 SERPL-SCNC: 17 MMOL/L
CREAT SERPL-MCNC: 0.76 MG/DL
CRP SERPL HS-MCNC: 0.71 MG/L
EOSINOPHIL # BLD AUTO: 0.12 K/UL
EOSINOPHIL NFR BLD AUTO: 1.7 %
GLUCOSE SERPL-MCNC: 98 MG/DL
HBA1C MFR BLD HPLC: 6.1 %
HCT VFR BLD CALC: 47.4 %
HDLC SERPL-MCNC: 51 MG/DL
HGB BLD-MCNC: 14.6 G/DL
IMM GRANULOCYTES NFR BLD AUTO: 0.3 %
LDLC SERPL CALC-MCNC: 102 MG/DL
LYMPHOCYTES # BLD AUTO: 2.13 K/UL
LYMPHOCYTES NFR BLD AUTO: 30.8 %
MAGNESIUM SERPL-MCNC: 2.5 MG/DL
MAN DIFF?: NORMAL
MCHC RBC-ENTMCNC: 26.1 PG
MCHC RBC-ENTMCNC: 30.8 GM/DL
MCV RBC AUTO: 84.8 FL
MONOCYTES # BLD AUTO: 0.47 K/UL
MONOCYTES NFR BLD AUTO: 6.8 %
NEUTROPHILS # BLD AUTO: 4.13 K/UL
NEUTROPHILS NFR BLD AUTO: 59.8 %
PLATELET # BLD AUTO: 147 K/UL
POTASSIUM SERPL-SCNC: 5.9 MMOL/L
PROT SERPL-MCNC: 7.9 G/DL
RBC # BLD: 5.59 M/UL
RBC # FLD: 14.9 %
SODIUM SERPL-SCNC: 141 MMOL/L
T3FREE SERPL-MCNC: 3.52 PG/ML
T4 FREE SERPL-MCNC: 1.2 NG/DL
TRIGL SERPL-MCNC: 309 MG/DL
TSH SERPL-ACNC: 2.18 UIU/ML
WBC # FLD AUTO: 6.91 K/UL

## 2019-07-17 ENCOUNTER — APPOINTMENT (OUTPATIENT)
Dept: CARDIOLOGY | Facility: CLINIC | Age: 69
End: 2019-07-17

## 2019-07-26 LAB
CHOLEST SERPL-MCNC: 161 MG/DL
CHOLEST/HDLC SERPL: 3.7 RATIO
HDLC SERPL-MCNC: 44 MG/DL
LDLC SERPL CALC-MCNC: 85 MG/DL
TRIGL SERPL-MCNC: 160 MG/DL

## 2019-11-05 ENCOUNTER — RX RENEWAL (OUTPATIENT)
Age: 69
End: 2019-11-05

## 2020-03-11 ENCOUNTER — APPOINTMENT (OUTPATIENT)
Dept: CARDIOLOGY | Facility: CLINIC | Age: 70
End: 2020-03-11

## 2020-03-16 ENCOUNTER — RX RENEWAL (OUTPATIENT)
Age: 70
End: 2020-03-16

## 2020-06-25 ENCOUNTER — RX RENEWAL (OUTPATIENT)
Age: 70
End: 2020-06-25

## 2020-09-14 ENCOUNTER — RX RENEWAL (OUTPATIENT)
Age: 70
End: 2020-09-14

## 2020-09-16 ENCOUNTER — APPOINTMENT (OUTPATIENT)
Dept: CARDIOLOGY | Facility: CLINIC | Age: 70
End: 2020-09-16
Payer: MEDICARE

## 2020-09-16 ENCOUNTER — NON-APPOINTMENT (OUTPATIENT)
Age: 70
End: 2020-09-16

## 2020-09-16 VITALS
HEART RATE: 89 BPM | SYSTOLIC BLOOD PRESSURE: 120 MMHG | TEMPERATURE: 98.2 F | WEIGHT: 162 LBS | OXYGEN SATURATION: 99 % | DIASTOLIC BLOOD PRESSURE: 80 MMHG | HEIGHT: 62 IN | BODY MASS INDEX: 29.81 KG/M2

## 2020-09-16 DIAGNOSIS — G45.4 TRANSIENT GLOBAL AMNESIA: ICD-10-CM

## 2020-09-16 PROCEDURE — 93000 ELECTROCARDIOGRAM COMPLETE: CPT

## 2020-09-16 PROCEDURE — 99213 OFFICE O/P EST LOW 20 MIN: CPT

## 2020-09-16 NOTE — REASON FOR VISIT
[Follow-Up - Clinic] : a clinic follow-up of [Hyperlipidemia] : hyperlipidemia [FreeTextEntry1] : Patient hospitalized in June, 2018 with episode of "transient global amnesia ".  Patient had selective amnesia lasted several hours, was seen in the emergency room without any evidence of acute stroke. Patient has been asymptomatic ever since. Evaluation in hospital demonstrated normal MRI and echocardiogram, mild to moderate left internal carotid artery stenosis was noted. She has had no interval complaints of chest pain, shortness of breath or palpitations and denies any fatigue, nausea, weight loss, focal neurological deficits. There have been no interval changes in her medications. She is a vegetarian and takes supplements for diet.\par \par Seen by Dr. Andino for TGA, no evidence on MRI of stroke, EEG unremarkable, no carotid stenosis.

## 2020-09-16 NOTE — DISCUSSION/SUMMARY
[Hyperlipidemia] : hyperlipidemia [Stable] : stable [Lipids Test Panel] : a fasting lipid profile [None] : none [___ Month(s)] : [unfilled] month(s) [FreeTextEntry1] : Patient with TGA but no evidence of CVA.\par Repeat labs, reassess hyperlipidemia.\par A heart healthy diet and lifestyle was recommended including low-fat, low-cholesterol, low-salt foods with proper weight maintenance and better carbohydrate choices.\par \par

## 2020-09-16 NOTE — CARDIOLOGY SUMMARY
[No Ischemia] : no Ischemia [No Symptoms] : no Symptoms [No Exercise Ind Arr] : no exercise induced arrhythmias [___] : [unfilled] [LVEF ___%] : LVEF [unfilled]%

## 2020-09-16 NOTE — PHYSICAL EXAM
[Normal Appearance] : normal appearance [General Appearance - Well Developed] : well developed [Well Groomed] : well groomed [General Appearance - Well Nourished] : well nourished [No Deformities] : no deformities [General Appearance - In No Acute Distress] : no acute distress [Normal Conjunctiva] : the conjunctiva exhibited no abnormalities [Eyelids - No Xanthelasma] : the eyelids demonstrated no xanthelasmas [Normal Oral Mucosa] : normal oral mucosa [No Oral Pallor] : no oral pallor [No Oral Cyanosis] : no oral cyanosis [Normal Jugular Venous A Waves Present] : normal jugular venous A waves present [Normal Jugular Venous V Waves Present] : normal jugular venous V waves present [No Jugular Venous Hector A Waves] : no jugular venous hector A waves [Respiration, Rhythm And Depth] : normal respiratory rhythm and effort [Exaggerated Use Of Accessory Muscles For Inspiration] : no accessory muscle use [Auscultation Breath Sounds / Voice Sounds] : lungs were clear to auscultation bilaterally [Heart Rate And Rhythm] : heart rate and rhythm were normal [Heart Sounds] : normal S1 and S2 [Murmurs] : no murmurs present [Abdomen Soft] : soft [Abdomen Tenderness] : non-tender [Abdomen Mass (___ Cm)] : no abdominal mass palpated [Abnormal Walk] : normal gait [Gait - Sufficient For Exercise Testing] : the gait was sufficient for exercise testing [Nail Clubbing] : no clubbing of the fingernails [Cyanosis, Localized] : no localized cyanosis [Petechial Hemorrhages (___cm)] : no petechial hemorrhages [Skin Color & Pigmentation] : normal skin color and pigmentation [] : no rash [No Venous Stasis] : no venous stasis [Skin Lesions] : no skin lesions [No Skin Ulcers] : no skin ulcer [No Xanthoma] : no  xanthoma was observed [Oriented To Time, Place, And Person] : oriented to person, place, and time [Affect] : the affect was normal [Mood] : the mood was normal [No Anxiety] : not feeling anxious

## 2020-09-17 LAB
ALBUMIN SERPL ELPH-MCNC: 4.8 G/DL
ALP BLD-CCNC: 113 U/L
ALT SERPL-CCNC: 28 U/L
ANION GAP SERPL CALC-SCNC: 12 MMOL/L
AST SERPL-CCNC: 24 U/L
BASOPHILS # BLD AUTO: 0.04 K/UL
BASOPHILS NFR BLD AUTO: 0.4 %
BILIRUB SERPL-MCNC: 0.6 MG/DL
BUN SERPL-MCNC: 11 MG/DL
CALCIUM SERPL-MCNC: 10.6 MG/DL
CHLORIDE SERPL-SCNC: 101 MMOL/L
CHOLEST SERPL-MCNC: 195 MG/DL
CHOLEST/HDLC SERPL: 4.1 RATIO
CO2 SERPL-SCNC: 25 MMOL/L
CREAT SERPL-MCNC: 0.83 MG/DL
CRP SERPL HS-MCNC: 2.41 MG/L
EOSINOPHIL # BLD AUTO: 0.15 K/UL
EOSINOPHIL NFR BLD AUTO: 1.7 %
ESTIMATED AVERAGE GLUCOSE: 126 MG/DL
GLUCOSE SERPL-MCNC: 108 MG/DL
HBA1C MFR BLD HPLC: 6 %
HCT VFR BLD CALC: 47.3 %
HDLC SERPL-MCNC: 48 MG/DL
HGB BLD-MCNC: 14.4 G/DL
IMM GRANULOCYTES NFR BLD AUTO: 0.2 %
LDLC SERPL CALC-MCNC: 96 MG/DL
LYMPHOCYTES # BLD AUTO: 2.35 K/UL
LYMPHOCYTES NFR BLD AUTO: 25.9 %
MAGNESIUM SERPL-MCNC: 2.5 MG/DL
MAN DIFF?: NORMAL
MCHC RBC-ENTMCNC: 26.2 PG
MCHC RBC-ENTMCNC: 30.4 GM/DL
MCV RBC AUTO: 86.2 FL
MONOCYTES # BLD AUTO: 0.6 K/UL
MONOCYTES NFR BLD AUTO: 6.6 %
NEUTROPHILS # BLD AUTO: 5.9 K/UL
NEUTROPHILS NFR BLD AUTO: 65.2 %
PLATELET # BLD AUTO: 160 K/UL
POTASSIUM SERPL-SCNC: 5 MMOL/L
PROT SERPL-MCNC: 7.4 G/DL
RBC # BLD: 5.49 M/UL
RBC # FLD: 15 %
SODIUM SERPL-SCNC: 138 MMOL/L
TRIGL SERPL-MCNC: 256 MG/DL
TSH SERPL-ACNC: 1.27 UIU/ML
WBC # FLD AUTO: 9.06 K/UL

## 2021-02-06 ENCOUNTER — EMERGENCY (EMERGENCY)
Facility: HOSPITAL | Age: 71
LOS: 1 days | Discharge: HOME | End: 2021-02-06
Attending: INTERNAL MEDICINE | Admitting: EMERGENCY MEDICINE
Payer: MEDICARE

## 2021-02-06 VITALS
TEMPERATURE: 97 F | OXYGEN SATURATION: 100 % | HEIGHT: 62 IN | DIASTOLIC BLOOD PRESSURE: 90 MMHG | RESPIRATION RATE: 16 BRPM | HEART RATE: 103 BPM | SYSTOLIC BLOOD PRESSURE: 170 MMHG

## 2021-02-06 LAB — TROPONIN T, HIGH SENSITIVITY RESULT: <6 NG/L — SIGNIFICANT CHANGE UP

## 2021-02-06 PROCEDURE — 99220: CPT

## 2021-02-06 PROCEDURE — 71046 X-RAY EXAM CHEST 2 VIEWS: CPT | Mod: 26

## 2021-02-06 RX ORDER — ASPIRIN/CALCIUM CARB/MAGNESIUM 324 MG
0 TABLET ORAL
Qty: 0 | Refills: 0 | DISCHARGE

## 2021-02-06 RX ORDER — ASPIRIN/CALCIUM CARB/MAGNESIUM 324 MG
162 TABLET ORAL ONCE
Refills: 0 | Status: COMPLETED | OUTPATIENT
Start: 2021-02-06 | End: 2021-02-06

## 2021-02-06 RX ORDER — ROSUVASTATIN CALCIUM 5 MG/1
1 TABLET ORAL
Qty: 0 | Refills: 0 | DISCHARGE

## 2021-02-06 RX ORDER — ASPIRIN/CALCIUM CARB/MAGNESIUM 324 MG
81 TABLET ORAL AT BEDTIME
Refills: 0 | Status: DISCONTINUED | OUTPATIENT
Start: 2021-02-07 | End: 2021-02-10

## 2021-02-06 RX ORDER — ROSUVASTATIN CALCIUM 5 MG/1
20 TABLET ORAL AT BEDTIME
Refills: 0 | Status: DISCONTINUED | OUTPATIENT
Start: 2021-02-06 | End: 2021-02-10

## 2021-02-06 RX ADMIN — Medication 162 MILLIGRAM(S): at 20:35

## 2021-02-06 NOTE — ED PROVIDER NOTE - ATTENDING CONTRIBUTION TO CARE
I performed a history and physical exam of the patient and discussed their management with the advanced care provider. I reviewed the advanced care provider's note and agree with the documented findings and plan of care. My medical decision making and objective findings are found above.     71 y/o female with HLD, p/w nausea which started last week followed by intermittent episodes of chest pressure, woke her from her sleep today, no SOB, no dizziness, no palpitations, no radiation of pain, no n/v today, no headache,  at this time on exam pt hypertensive, mildly tachycardic, chest pain free, Lungs CTA b/l, RRR, abd soft nt/nd, no C/C/E 1)tele 2)CBC, CMP, PT/PTT, CE, Troponin, 3)admit to CDU/floor

## 2021-02-06 NOTE — ED PROVIDER NOTE - FAMILY HISTORY
Father  Still living? Unknown  Family history of cerebrovascular accident (CVA), Age at diagnosis: Age Unknown   Father  Still living? Unknown  Family history of cerebrovascular accident (CVA), Age at diagnosis: Age Unknown     Mother  Still living? Unknown  Family history of MI (myocardial infarction), Age at diagnosis: Age Unknown

## 2021-02-06 NOTE — ED CDU PROVIDER INITIAL DAY NOTE - PHYSICAL EXAMINATION
CONSTITUTIONAL:  Well appearing, awake, alert, oriented to person, place, time/situation and in no apparent distress.  Pt. is objectively comfortable appearing and verbalizing in full, clear, effortless sentences.  ENMT: NC/AT.  Airway patent.  Nasal mucosa clear.  Moist mucous membranes.  Neck supple.  EYES:  Clear OU.  CARDIAC:  Normal rate, regular rhythm.  Heart sounds S1 S2.  No murmurs, gallops, or rubs.  RESPIRATORY:  Breath sounds clear and equal bilaterally.  No wheezes, no rales, no rhonchi.  GASTROINTESTINAL:  Abdomen soft, non-distended, non-tender.  No rebound, no guarding.  MUSCULOSKELETAL:  Range of motion is not limited.  No lower extremity pitting / edema noted.  LE appear grossly symmetrical bilaterally.   SKIN:  Skin color unremarkable.  Skin warm, dry, and intact.    PSYCHIATRIC:  Alert and oriented to person/place/time/situation.  Mood and affect WNL.  No apparent risk to self or others.

## 2021-02-06 NOTE — ED CDU PROVIDER INITIAL DAY NOTE - MEDICAL DECISION MAKING DETAILS
Tele monitoring, stress test, general observation care / monitoring; pt follows with PMD/cardiologist Dr. Alphonso Birch - call placed to office # [phone], requested call back from on-call provider covering service to discuss plan, awaiting call back at time of this documentation.

## 2021-02-06 NOTE — ED CDU PROVIDER INITIAL DAY NOTE - OBJECTIVE STATEMENT
71 Y/O F PMH HLD C/O 3 days of CP. Pt states she shoveled snow and felt ok but late that night she developed pressure in the center of her chest. Pt states this has been intermittent since. Pt states she took Tylenol with some improvement, last occurrence was last night. Pt denies diaphoresis, states she saw a cardiologist in the past and had a stress test but not recently. Pt states her mother had an MI in her 50s. Pt denies any other sx or acute complaints.    CDU SAUL Alcaraz Note------  ED Provider HPI as above, reviewed.  Pt. is a 69 yo female, PMH hyperlipidemia (on Crestor), and transient global amnesia 2 years ago; no PSH; pt presented to the ED c/o intermittent chest pain x 3 days, which started after pt shoveled snow.  Pt states symptoms have mostly occurred at night-time but have also occurred during the day, described as pressure-like central chest discomfort.  Pt. denies SOB, palpitations, lightheadedness, diaphoresis, syncope.  Pt. states she has had some intermittent nausea over the few days preceding onset of chest discomfort; denies vomiting, diarrhea, abdominal pain or discomfort.  Pt. follows with cardiologist Dr. Alphonso Birch; states the last stress test she had was a few years ago; pt states she had an echo at that time as well; pt states the test results were OK at that time.  FHx significant for mother with MI in her 50s.  Pt. denies hx/o substance use.  In the ED, EKG sinus tach 103 bpm with no acute/ischemic findings.  Trop <6 --->  <6, CBC/CMP grossly unremarkable, CXR with no acute pathology noted.  Pt. was dispo'd to CDU for continued care plan:  Tele monitoring, stress test, general observation care / monitoring.  Pt follows with PMD/cardiologist Dr. Alphonso Birch - this writer placed call to office # [phone], requested call back from on-call provider covering service to discuss plan, awaiting call back at time of this documentation.

## 2021-02-06 NOTE — ED ADULT NURSE NOTE - OBJECTIVE STATEMENT
Pt c/o chest pressure after shoveling snow --pt states pain is intermittent   off and on x2 days--pt appears in no distress--Pt placed on cardiac monitor in NSR  Pt looks in no distress. Color pink,skin warm and dry--pt alert and orientated x3

## 2021-02-06 NOTE — ED ADULT TRIAGE NOTE - CHIEF COMPLAINT QUOTE
pt c/o 3 days of CP. States pain started when shoveling snow. PMH HTN, HLD, heart murmur. Denies any other significant cardiac history. Denies SOB, fever, cough. Appears in NAD. RR even and unlabored.

## 2021-02-06 NOTE — ED PROVIDER NOTE - OBJECTIVE STATEMENT
71 Y/O F PMH HLD C/O 3 days of CP. Pt states she shoveled snow and felt ok but late that night she developed pressure in the center of her chest. Pt states this has been intermittent since. Pt states she took Tylenol with some improvement, last occurrence was last night. Pt denies diaphoresis, states she saw a cardiologist in the past and had a stress test but not recently. Pt states her mother had an MI in her 50s. Pt denies any other sx or acute complaints.

## 2021-02-06 NOTE — ED PROVIDER NOTE - RATE
----- Message from Rosette Henry MD sent at 1/25/2021 11:56 AM CST -----  HCGs are still rising slowly but technically are increasing by 30%. Progesterone is still very low, so still high risk of abnormal pregnancy. However, given continued low normal rise, plan to recheck level again in 6 days unless pt would develop any symptoms. Plan to obtain viability US when HCG is at discriminatory zone, sooner if pt would have any symptoms or if HCG plateaus. Thanks   103

## 2021-02-06 NOTE — ED CDU PROVIDER INITIAL DAY NOTE - FAMILY HISTORY
Father  Still living? Unknown  Family history of cerebrovascular accident (CVA), Age at diagnosis: Age Unknown     Mother  Still living? Unknown  Family history of MI (myocardial infarction), Age at diagnosis: Age Unknown

## 2021-02-06 NOTE — ED CDU PROVIDER INITIAL DAY NOTE - DETAILS
Tele monitoring, stress test, general observation care / monitoring; pt follows with PMD/cardiologist Dr. Alphonso Birch - call placed to office # [phone], requested call back from on-call provider covering service to discuss plan, awaiting call back at time of this documentation.

## 2021-02-06 NOTE — ED CDU PROVIDER INITIAL DAY NOTE - INDICATION FOR OBSERVATION
Subjective   Radha Acosta is a 73 y.o. female who presents for a follow up on hypertension, diabetes.    Diabetes   She presents for her follow-up diabetic visit. She has type 2 diabetes mellitus. Her disease course has been stable. There are no hypoglycemic associated symptoms. Pertinent negatives for diabetes include no chest pain, no fatigue and no weight loss. There are no hypoglycemic complications. Diabetic complications include a CVA and nephropathy. Pertinent negatives for diabetic complications include no PVD. Risk factors for coronary artery disease include diabetes mellitus, dyslipidemia, family history, stress, post-menopausal and hypertension. Current diabetic treatment includes oral agent (monotherapy). She is compliant with treatment all of the time. Her weight is stable. She is following a generally healthy and low fat/cholesterol diet. Meal planning includes carbohydrate counting. She has not had a previous visit with a dietitian. She participates in exercise daily. She monitors urine at home <1 x per month. An ACE inhibitor/angiotensin II receptor blocker is being taken. She does not see a podiatrist.Eye exam is not current.      Diabetes has been stable. Not going out to gym or [employer]. Is working out at home. Has not gained weight  Wants to know if can stop magnesium  Joints in fingers less painful on meloxicam.   RLE pain since fall at [employer] 2 yrs ago.   Is still taking ASA daily. BP has been well controlled  The following portions of the patient's history were reviewed and updated as appropriate: allergies, current medications, past family history, past medical history, past social history, past surgical history and problem list.    Review of Systems   Constitutional: Negative for activity change, appetite change, fatigue, unexpected weight gain and unexpected weight loss.   Respiratory: Negative.  Negative for shortness of breath.    Cardiovascular: Positive for leg  "swelling (with stand too long, compression helps). Negative for chest pain and palpitations.   Gastrointestinal: Negative for abdominal distention and abdominal pain.   Musculoskeletal: Positive for arthralgias and myalgias. Negative for back pain, neck pain and neck stiffness.   Psychiatric/Behavioral: Negative.      /64   Pulse 66   Temp 97.4 °F (36.3 °C)   Ht 165.1 cm (65\")   Wt 68.5 kg (151 lb)   SpO2 97%   BMI 25.13 kg/m²     Objective   Physical Exam   Constitutional: She appears well-developed and well-nourished. No distress.   Neck: Normal range of motion. Neck supple. No thyromegaly present.   Cardiovascular: Normal rate, regular rhythm and normal heart sounds.   Pulmonary/Chest: Effort normal and breath sounds normal.   Lymphadenopathy:     She has no cervical adenopathy.   Skin: Skin is warm and dry.   Psychiatric: She has a normal mood and affect. Her behavior is normal. Judgment and thought content normal.   Nursing note and vitals reviewed.    Assessment/Plan   Problems Addressed this Visit        Cardiovascular and Mediastinum    Benign essential hypertension - Primary    Relevant Orders    CBC & Differential    Comprehensive Metabolic Panel    Cerebral atherosclerosis    Familial hypertriglyceridemia    Relevant Orders    Lipid Panel       Endocrine    Type 2 diabetes mellitus with microalbuminuria, without long-term current use of insulin (CMS/[facility])    Relevant Orders    Comprehensive Metabolic Panel    Hemoglobin A1c    Microalbumin / Creatinine Urine Ratio - Urine, Clean Catch    TSH    Vitamin B12       Musculoskeletal and Integument    Saul nodes (DJD hand)      Other Visit Diagnoses     Low magnesium level        Relevant Orders    Magnesium        HTN--does have Hx CVA, tight control recommended. Continue plavix and ASA  High triglycerides--well controlled in 2019, recheck and continue statin  Y4ZN--yknlrvb adequate control, but recommend check every 6 months--delayed secondary " to pandemic  DJD hands--better control with meloxicam  Low magnesium--tolerating replacement well--recheck levels--consider stopping supplement  Reviewed immunizations--declines update for now.          Tele monitoring, stress test, general observation care / monitoring; pt follows with PMD/cardiologist Dr. Alphonso Birch - call placed to office # [phone], requested call back from on-call provider covering service to discuss plan, awaiting call back at time of this documentation./Diagnostic Uncertainty/Other

## 2021-02-06 NOTE — ED CDU PROVIDER INITIAL DAY NOTE - PROGRESS NOTE DETAILS
Dr. Castro, on call provider for Dr. Birch, called back and case discussed.  Dr. Castro states team does not come to [facility]; requested house cardiology.  Plan for House Cards consult in AM.

## 2021-02-06 NOTE — ED PROVIDER NOTE - CLINICAL SUMMARY MEDICAL DECISION MAKING FREE TEXT BOX
69 Y/O F PMH HLD C/O 3 days of CP. Pt states she shoveled snow and felt ok but late that night she developed pressure in the center of her chest. Pt states this has been intermittent since. Plan is CDU for further cardiac workup.

## 2021-02-06 NOTE — ED CDU PROVIDER INITIAL DAY NOTE - ATTENDING CONTRIBUTION TO CARE
I performed a history and physical exam of the patient and discussed their management with the advanced care provider. I reviewed the advanced care provider's note and agree with the documented findings and plan of care. My medical decision making and objective findings are found above.     71 y/o female with HLD, family hx of CAD, p/w nausea and intermittent episodes of chest pressure after shoveling x 4 days, EKG and troponin done, admit to CDU for further tele monitoring, and further testing,

## 2021-02-07 ENCOUNTER — NON-APPOINTMENT (OUTPATIENT)
Age: 71
End: 2021-02-07

## 2021-02-07 VITALS
SYSTOLIC BLOOD PRESSURE: 139 MMHG | HEART RATE: 97 BPM | RESPIRATION RATE: 16 BRPM | TEMPERATURE: 98 F | DIASTOLIC BLOOD PRESSURE: 82 MMHG | OXYGEN SATURATION: 98 %

## 2021-02-07 LAB
CHOLEST SERPL-MCNC: 157 MG/DL — SIGNIFICANT CHANGE UP
HDLC SERPL-MCNC: 45 MG/DL — LOW
LIPID PNL WITH DIRECT LDL SERPL: 83 MG/DL — SIGNIFICANT CHANGE UP
NON HDL CHOLESTEROL: 112 MG/DL — SIGNIFICANT CHANGE UP
SARS-COV-2 RNA SPEC QL NAA+PROBE: SIGNIFICANT CHANGE UP
TRIGL SERPL-MCNC: 146 MG/DL — SIGNIFICANT CHANGE UP

## 2021-02-07 PROCEDURE — 78452 HT MUSCLE IMAGE SPECT MULT: CPT | Mod: 26

## 2021-02-07 PROCEDURE — 99217: CPT

## 2021-02-07 PROCEDURE — 93016 CV STRESS TEST SUPVJ ONLY: CPT | Mod: GC

## 2021-02-07 PROCEDURE — 93018 CV STRESS TEST I&R ONLY: CPT | Mod: GC

## 2021-02-07 RX ADMIN — ROSUVASTATIN CALCIUM 20 MILLIGRAM(S): 5 TABLET ORAL at 01:30

## 2021-02-07 NOTE — ED CDU PROVIDER DISPOSITION NOTE - PATIENT PORTAL LINK FT
You can access the FollowMyHealth Patient Portal offered by [facility] by registering at the following website: [email or web address]. By joining [facility]’s FollowMyHealth portal, you will also be able to view your health information using other applications (apps) compatible with our system.

## 2021-02-07 NOTE — ED CDU PROVIDER SUBSEQUENT DAY NOTE - PROGRESS NOTE DETAILS
as per house cardiology fellow Nicola Kee and Dr. Dooley, pt can follow up outpatient, stress test negative.

## 2021-02-07 NOTE — ED CDU PROVIDER SUBSEQUENT DAY NOTE - HISTORY
69 y/o female with pmhx of HLD, presents to ED c/o chest pain x 4 days. Pt states she was shoveling snow and hours later at night developed intermittent "chest pressure." Associated with nausea. Pt denies any symptoms in CDU today. Feeling well. No ocps or estrogen use, recent travel, surgeries, hospitalizations, le edema, calf pain, hx of dvt/pe.  No hx of COVID.

## 2021-02-07 NOTE — ED CDU PROVIDER DISPOSITION NOTE - ATTENDING CONTRIBUTION TO CARE
margie: pt in cdu bc of chest pain after shoveling that persisted, no cp here now.  stress is normal.  house cards feels givwen neg stress can f/u outpt with her cardiologist    pt looking well, ambulating resp effort normal    I performed a history and physical exam of the patient and discussed their management with the resident and /or advanced care provider. I reviewed the resident and /or ACP's note and agree with the documented findings and plan of care. My medical decison making and observations are found above.

## 2021-02-07 NOTE — ED CDU PROVIDER DISPOSITION NOTE - CLINICAL COURSE
71 y/o female with pmhx of HLD, presents to ED c/o chest pain x 4 days. Pt states she was shoveling snow and hours later at night developed intermittent "chest pressure." Associated with nausea. Pt denies any symptoms in CDU today. Feeling well. No pe risk factors. No hx of COVID. troponin negative x 2. ekg without evidence of ischemia. sent to cdu for tele monitoring and r/o ACS. stress test negative today. pt amenable for dc home.

## 2021-02-07 NOTE — ED CDU PROVIDER SUBSEQUENT DAY NOTE - ATTENDING CONTRIBUTION TO CARE
margie: pt in cdu bc of chest pain after shoveling that persisted, no cp here now.  plan is for stress and echo and cardiology input.  pt has cardiologist who has requested house cards.  pt looking well, ambulating resp effort normal    I performed a history and physical exam of the patient and discussed their management with the resident and /or advanced care provider. I reviewed the resident and /or ACP's note and agree with the documented findings and plan of care. My medical decison making and observations are found above.

## 2021-02-07 NOTE — ED CDU PROVIDER SUBSEQUENT DAY NOTE - MEDICAL DECISION MAKING DETAILS
69 y/o female with pmhx of HLD, presents to ED c/o chest pain x 4 days. Pt states she was shoveling snow and hours later at night developed intermittent "chest pressure." Associated with nausea. Pt denies any symptoms in CDU today. Feeling well. no pe risk factors. sent to cdu for r/o ACS. plan for stress test, tele monitoring. as per outpatient Cardioloigst Dr. Birch would like pt to be evaluated by house cardiology.

## 2021-02-09 PROBLEM — G45.4 TRANSIENT GLOBAL AMNESIA: Chronic | Status: ACTIVE | Noted: 2021-02-06

## 2021-02-09 PROBLEM — E78.5 HYPERLIPIDEMIA, UNSPECIFIED: Chronic | Status: ACTIVE | Noted: 2021-02-06

## 2021-02-17 ENCOUNTER — NON-APPOINTMENT (OUTPATIENT)
Age: 71
End: 2021-02-17

## 2021-02-17 ENCOUNTER — APPOINTMENT (OUTPATIENT)
Dept: CARDIOLOGY | Facility: CLINIC | Age: 71
End: 2021-02-17
Payer: MEDICARE

## 2021-02-17 VITALS
WEIGHT: 158 LBS | BODY MASS INDEX: 29.08 KG/M2 | HEIGHT: 62 IN | HEART RATE: 94 BPM | DIASTOLIC BLOOD PRESSURE: 88 MMHG | OXYGEN SATURATION: 96 % | TEMPERATURE: 97.7 F | SYSTOLIC BLOOD PRESSURE: 120 MMHG

## 2021-02-17 DIAGNOSIS — R07.9 CHEST PAIN, UNSPECIFIED: ICD-10-CM

## 2021-02-17 PROCEDURE — 93000 ELECTROCARDIOGRAM COMPLETE: CPT

## 2021-02-17 PROCEDURE — 99213 OFFICE O/P EST LOW 20 MIN: CPT

## 2021-02-17 NOTE — PHYSICAL EXAM
[General Appearance - Well Developed] : well developed [Normal Appearance] : normal appearance [Well Groomed] : well groomed [No Deformities] : no deformities [General Appearance - Well Nourished] : well nourished [General Appearance - In No Acute Distress] : no acute distress [Normal Jugular Venous A Waves Present] : normal jugular venous A waves present [Normal Jugular Venous V Waves Present] : normal jugular venous V waves present [No Jugular Venous Hector A Waves] : no jugular venous hector A waves [Respiration, Rhythm And Depth] : normal respiratory rhythm and effort [Exaggerated Use Of Accessory Muscles For Inspiration] : no accessory muscle use [Auscultation Breath Sounds / Voice Sounds] : lungs were clear to auscultation bilaterally [Heart Rate And Rhythm] : heart rate and rhythm were normal [Heart Sounds] : normal S1 and S2 [Murmurs] : no murmurs present [Nail Clubbing] : no clubbing of the fingernails [Cyanosis, Localized] : no localized cyanosis [Petechial Hemorrhages (___cm)] : no petechial hemorrhages [] : no ischemic changes [Oriented To Time, Place, And Person] : oriented to person, place, and time [Affect] : the affect was normal [Mood] : the mood was normal [No Anxiety] : not feeling anxious

## 2021-02-19 NOTE — DISCUSSION/SUMMARY
[Unlikely Cardiac Ischemia (Low Prob.)] : chest pain unlikely to represent cardiac ischemia (low probability) [GERD] : gastroesophageal reflux disease [Musculoskeletal Chest Pain] : musculoskeletal chest pain [None] : none [FreeTextEntry1] : Patient seen for atypical chest discomfort, appears more GI than cardiac. Referred to GI, if CP recurs/worsens, RTC.

## 2021-02-19 NOTE — HISTORY OF PRESENT ILLNESS
[FreeTextEntry1] : Jaymie presents foe follow up after being seen in the hospital for complaint of chest pain. Described as mid sternal, no radiation. occurred mostly during the night with occasional daytime occurrences. Began before snowstorms, but had further episode several days after shoveling snow. relieved with Tylenol. Workup, including nuclear stress test, was normal, with no evidence of CAD. She started taking Nexium a few days ago, on the advice of a friend.\par She currently denies any CP, SOB, dizziness.

## 2021-07-26 ENCOUNTER — RX RENEWAL (OUTPATIENT)
Age: 71
End: 2021-07-26

## 2021-09-22 ENCOUNTER — NON-APPOINTMENT (OUTPATIENT)
Age: 71
End: 2021-09-22

## 2021-09-22 ENCOUNTER — APPOINTMENT (OUTPATIENT)
Dept: CARDIOLOGY | Facility: CLINIC | Age: 71
End: 2021-09-22
Payer: MEDICARE

## 2021-09-22 VITALS
SYSTOLIC BLOOD PRESSURE: 125 MMHG | BODY MASS INDEX: 29.81 KG/M2 | HEART RATE: 83 BPM | OXYGEN SATURATION: 97 % | WEIGHT: 162 LBS | HEIGHT: 62 IN | DIASTOLIC BLOOD PRESSURE: 80 MMHG

## 2021-09-22 PROCEDURE — 93000 ELECTROCARDIOGRAM COMPLETE: CPT

## 2021-09-22 PROCEDURE — 99213 OFFICE O/P EST LOW 20 MIN: CPT

## 2021-09-22 NOTE — DISCUSSION/SUMMARY
[Unlikely Cardiac Ischemia (Low Prob.)] : chest pain unlikely to represent cardiac ischemia (low probability) [GERD] : gastroesophageal reflux disease [Musculoskeletal Chest Pain] : musculoskeletal chest pain [Hyperlipidemia] : hyperlipidemia [Stable] : stable [Lipids Test Panel] : a fasting lipid profile [___ Month(s)] : in [unfilled] month(s) [FreeTextEntry1] : Patient with h/o TGA but no evidence of CVA.\par Repeat labs, reassess hyperlipidemia.\par A heart healthy diet and lifestyle was recommended including low-fat, low-cholesterol, low-salt foods with proper weight maintenance and better carbohydrate choices.\par Increase activity as tolerated.\par \par

## 2021-09-23 LAB
ALBUMIN SERPL ELPH-MCNC: 4.7 G/DL
ALP BLD-CCNC: 114 U/L
ALT SERPL-CCNC: 31 U/L
ANION GAP SERPL CALC-SCNC: 12 MMOL/L
AST SERPL-CCNC: 27 U/L
BASOPHILS # BLD AUTO: 0.04 K/UL
BASOPHILS NFR BLD AUTO: 0.6 %
BILIRUB SERPL-MCNC: 0.4 MG/DL
BUN SERPL-MCNC: 12 MG/DL
CALCIUM SERPL-MCNC: 10.5 MG/DL
CHLORIDE SERPL-SCNC: 106 MMOL/L
CHOLEST SERPL-MCNC: 182 MG/DL
CO2 SERPL-SCNC: 24 MMOL/L
CREAT SERPL-MCNC: 0.75 MG/DL
CRP SERPL HS-MCNC: 0.39 MG/L
EOSINOPHIL # BLD AUTO: 0.12 K/UL
EOSINOPHIL NFR BLD AUTO: 1.8 %
ESTIMATED AVERAGE GLUCOSE: 131 MG/DL
GLUCOSE SERPL-MCNC: 115 MG/DL
HBA1C MFR BLD HPLC: 6.2 %
HCT VFR BLD CALC: 46 %
HDLC SERPL-MCNC: 46 MG/DL
HGB BLD-MCNC: 13.3 G/DL
IMM GRANULOCYTES NFR BLD AUTO: 0.1 %
LDLC SERPL CALC-MCNC: 88 MG/DL
LYMPHOCYTES # BLD AUTO: 2.42 K/UL
LYMPHOCYTES NFR BLD AUTO: 35.9 %
MAN DIFF?: NORMAL
MCHC RBC-ENTMCNC: 25.8 PG
MCHC RBC-ENTMCNC: 28.9 GM/DL
MCV RBC AUTO: 89.1 FL
MONOCYTES # BLD AUTO: 0.43 K/UL
MONOCYTES NFR BLD AUTO: 6.4 %
NEUTROPHILS # BLD AUTO: 3.72 K/UL
NEUTROPHILS NFR BLD AUTO: 55.2 %
NONHDLC SERPL-MCNC: 135 MG/DL
PLATELET # BLD AUTO: 135 K/UL
POTASSIUM SERPL-SCNC: 4.9 MMOL/L
PROT SERPL-MCNC: 7.6 G/DL
RBC # BLD: 5.16 M/UL
RBC # FLD: 15.9 %
SODIUM SERPL-SCNC: 142 MMOL/L
TRIGL SERPL-MCNC: 235 MG/DL
TSH SERPL-ACNC: 1.31 UIU/ML
WBC # FLD AUTO: 6.74 K/UL

## 2022-03-23 ENCOUNTER — APPOINTMENT (OUTPATIENT)
Dept: CARDIOLOGY | Facility: CLINIC | Age: 72
End: 2022-03-23
Payer: MEDICARE

## 2022-03-23 ENCOUNTER — NON-APPOINTMENT (OUTPATIENT)
Age: 72
End: 2022-03-23

## 2022-03-23 VITALS
BODY MASS INDEX: 29.44 KG/M2 | SYSTOLIC BLOOD PRESSURE: 130 MMHG | DIASTOLIC BLOOD PRESSURE: 70 MMHG | OXYGEN SATURATION: 98 % | HEIGHT: 62 IN | WEIGHT: 160 LBS | HEART RATE: 80 BPM

## 2022-03-23 PROCEDURE — 93000 ELECTROCARDIOGRAM COMPLETE: CPT

## 2022-03-23 PROCEDURE — 99213 OFFICE O/P EST LOW 20 MIN: CPT

## 2022-03-23 NOTE — HISTORY OF PRESENT ILLNESS
[FreeTextEntry1] : 70 yo female followed for h/o HLD, no h/o ASHD otherwise. Distant h/o TGA with no documented CVA.\par Jaymie denies any CP, SOB, dizziness.\par

## 2022-03-23 NOTE — CARDIOLOGY SUMMARY
[No Ischemia] : no Ischemia [No Exercise Ind Arr] : no exercise induced arrhythmias [No Symptoms] : no Symptoms [___] : [unfilled] [LVEF ___%] : LVEF [unfilled]% [de-identified] : 3/23/22, Sinus  Rhythm \par -Old anterior infarct.

## 2022-03-23 NOTE — DISCUSSION/SUMMARY
[Unlikely Cardiac Ischemia (Low Prob.)] : chest pain unlikely to represent cardiac ischemia (low probability) [GERD] : gastroesophageal reflux disease [Musculoskeletal Chest Pain] : musculoskeletal chest pain [Hyperlipidemia] : hyperlipidemia [Stable] : stable [Lipids Test Panel] : a fasting lipid profile [___ Month(s)] : in [unfilled] month(s) [FreeTextEntry1] : Patient with h/o TGA but no evidence of CVA.\par +LICA stenosis on hospital study, repeated by Neurology and reportedly there are no ICA obstructions found.\par Repeat labs, reassess hyperlipidemia.\par A heart healthy diet and lifestyle was recommended including low-fat, low-cholesterol, low-salt foods with proper weight maintenance and better carbohydrate choices.\par Increase activity as tolerated.\par \par

## 2022-03-24 LAB
ALBUMIN SERPL ELPH-MCNC: 4.9 G/DL
ALP BLD-CCNC: 120 U/L
ALT SERPL-CCNC: 27 U/L
ANION GAP SERPL CALC-SCNC: 14 MMOL/L
AST SERPL-CCNC: 22 U/L
BASOPHILS # BLD AUTO: 0.05 K/UL
BASOPHILS NFR BLD AUTO: 0.8 %
BILIRUB SERPL-MCNC: 0.5 MG/DL
BUN SERPL-MCNC: 12 MG/DL
CALCIUM SERPL-MCNC: 10.6 MG/DL
CHLORIDE SERPL-SCNC: 104 MMOL/L
CO2 SERPL-SCNC: 23 MMOL/L
CREAT SERPL-MCNC: 0.72 MG/DL
EGFR: 90 ML/MIN/1.73M2
EOSINOPHIL # BLD AUTO: 0.1 K/UL
EOSINOPHIL NFR BLD AUTO: 1.6 %
ESTIMATED AVERAGE GLUCOSE: 128 MG/DL
GLUCOSE SERPL-MCNC: 105 MG/DL
HBA1C MFR BLD HPLC: 6.1 %
HCT VFR BLD CALC: 45.6 %
HGB BLD-MCNC: 13.8 G/DL
IMM GRANULOCYTES NFR BLD AUTO: 0.2 %
LYMPHOCYTES # BLD AUTO: 2.03 K/UL
LYMPHOCYTES NFR BLD AUTO: 32.1 %
MAGNESIUM SERPL-MCNC: 2.6 MG/DL
MAN DIFF?: NORMAL
MCHC RBC-ENTMCNC: 26 PG
MCHC RBC-ENTMCNC: 30.3 GM/DL
MCV RBC AUTO: 85.9 FL
MONOCYTES # BLD AUTO: 0.36 K/UL
MONOCYTES NFR BLD AUTO: 5.7 %
NEUTROPHILS # BLD AUTO: 3.77 K/UL
NEUTROPHILS NFR BLD AUTO: 59.6 %
PLATELET # BLD AUTO: 161 K/UL
POTASSIUM SERPL-SCNC: 4.8 MMOL/L
PROT SERPL-MCNC: 7.7 G/DL
RBC # BLD: 5.31 M/UL
RBC # FLD: 15.4 %
SODIUM SERPL-SCNC: 141 MMOL/L
TSH SERPL-ACNC: 1.52 UIU/ML
WBC # FLD AUTO: 6.32 K/UL

## 2022-03-25 LAB
CHOLEST SERPL-MCNC: 181 MG/DL
HDLC SERPL-MCNC: 52 MG/DL
LDLC SERPL CALC-MCNC: 87 MG/DL
NONHDLC SERPL-MCNC: 129 MG/DL
TRIGL SERPL-MCNC: 207 MG/DL

## 2022-05-02 ENCOUNTER — RX RENEWAL (OUTPATIENT)
Age: 72
End: 2022-05-02

## 2022-09-28 ENCOUNTER — APPOINTMENT (OUTPATIENT)
Dept: CARDIOLOGY | Facility: CLINIC | Age: 72
End: 2022-09-28

## 2022-09-28 ENCOUNTER — NON-APPOINTMENT (OUTPATIENT)
Age: 72
End: 2022-09-28

## 2022-09-28 VITALS
WEIGHT: 159 LBS | BODY MASS INDEX: 29.26 KG/M2 | HEIGHT: 62 IN | SYSTOLIC BLOOD PRESSURE: 120 MMHG | DIASTOLIC BLOOD PRESSURE: 78 MMHG

## 2022-09-28 DIAGNOSIS — I65.22 OCCLUSION AND STENOSIS OF LEFT CAROTID ARTERY: ICD-10-CM

## 2022-09-28 PROCEDURE — 99213 OFFICE O/P EST LOW 20 MIN: CPT

## 2022-09-28 PROCEDURE — 93000 ELECTROCARDIOGRAM COMPLETE: CPT

## 2022-09-28 NOTE — DISCUSSION/SUMMARY
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

## 2022-09-28 NOTE — HISTORY OF PRESENT ILLNESS
[FreeTextEntry1] : 71 yo female followed for h/o HLD, no h/o ASHD otherwise. Distant h/o TGA with no documented CVA.\par Jaymie denies any CP, SOB, dizziness.\par

## 2022-09-28 NOTE — CARDIOLOGY SUMMARY
[de-identified] : 9/28/22, NSR, old AWMI\par 3/23/22, Sinus  Rhythm , -Old anterior infarct.  [No Ischemia] : no Ischemia [No Exercise Ind Arr] : no exercise induced arrhythmias [No Symptoms] : no Symptoms [___] : [unfilled] [LVEF ___%] : LVEF [unfilled]% [___] : [unfilled]

## 2023-03-29 ENCOUNTER — APPOINTMENT (OUTPATIENT)
Dept: CARDIOLOGY | Facility: CLINIC | Age: 73
End: 2023-03-29
Payer: MEDICARE

## 2023-03-29 ENCOUNTER — NON-APPOINTMENT (OUTPATIENT)
Age: 73
End: 2023-03-29

## 2023-03-29 VITALS
DIASTOLIC BLOOD PRESSURE: 74 MMHG | HEART RATE: 81 BPM | BODY MASS INDEX: 29.26 KG/M2 | SYSTOLIC BLOOD PRESSURE: 122 MMHG | OXYGEN SATURATION: 99 % | HEIGHT: 62 IN | WEIGHT: 159 LBS

## 2023-03-29 PROCEDURE — 99213 OFFICE O/P EST LOW 20 MIN: CPT

## 2023-03-29 PROCEDURE — 93000 ELECTROCARDIOGRAM COMPLETE: CPT

## 2023-03-29 NOTE — HISTORY OF PRESENT ILLNESS
[FreeTextEntry1] : 71 yo female followed for h/o HLD, no h/o ASHD otherwise. \par Distant h/o TGA with no documented CVA.\par Jaymie denies any CP, SOB, dizziness.\par No recent labs, to be drawn today.\par

## 2023-03-29 NOTE — CARDIOLOGY SUMMARY
[No Ischemia] : no Ischemia [No Exercise Ind Arr] : no exercise induced arrhythmias [No Symptoms] : no Symptoms [___] : [unfilled] [LVEF ___%] : LVEF [unfilled]% [de-identified] : 3/29/23, Sinus  Rhythm , Low voltage in precordial leads.  -Poor R-wave progression -may be secondary to pulmonary disease   consider old anterior infarct. 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

## 2023-03-29 NOTE — DISCUSSION/SUMMARY
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

## 2023-04-08 LAB
ALBUMIN SERPL ELPH-MCNC: 4.6 G/DL
ALP BLD-CCNC: 108 U/L
ALT SERPL-CCNC: 23 U/L
ANION GAP SERPL CALC-SCNC: 12 MMOL/L
AST SERPL-CCNC: 21 U/L
BASOPHILS # BLD AUTO: 0.05 K/UL
BASOPHILS NFR BLD AUTO: 0.7 %
BILIRUB SERPL-MCNC: 0.4 MG/DL
BUN SERPL-MCNC: 13 MG/DL
CALCIUM SERPL-MCNC: 11 MG/DL
CHLORIDE SERPL-SCNC: 104 MMOL/L
CHOLEST SERPL-MCNC: 186 MG/DL
CO2 SERPL-SCNC: 25 MMOL/L
CREAT SERPL-MCNC: 0.72 MG/DL
EGFR: 89 ML/MIN/1.73M2
EOSINOPHIL # BLD AUTO: 0.08 K/UL
EOSINOPHIL NFR BLD AUTO: 1.1 %
ESTIMATED AVERAGE GLUCOSE: 131 MG/DL
GLUCOSE SERPL-MCNC: 106 MG/DL
HBA1C MFR BLD HPLC: 6.2 %
HCT VFR BLD CALC: 48.7 %
HDLC SERPL-MCNC: 52 MG/DL
HGB BLD-MCNC: 14 G/DL
IMM GRANULOCYTES NFR BLD AUTO: 0.1 %
LDLC SERPL CALC-MCNC: 90 MG/DL
LYMPHOCYTES # BLD AUTO: 2.4 K/UL
LYMPHOCYTES NFR BLD AUTO: 33.8 %
MAN DIFF?: NORMAL
MCHC RBC-ENTMCNC: 25.8 PG
MCHC RBC-ENTMCNC: 28.7 GM/DL
MCV RBC AUTO: 89.9 FL
MONOCYTES # BLD AUTO: 0.39 K/UL
MONOCYTES NFR BLD AUTO: 5.5 %
NEUTROPHILS # BLD AUTO: 4.17 K/UL
NEUTROPHILS NFR BLD AUTO: 58.8 %
NONHDLC SERPL-MCNC: 134 MG/DL
PLATELET # BLD AUTO: 198 K/UL
POTASSIUM SERPL-SCNC: 4.4 MMOL/L
PROT SERPL-MCNC: 7.4 G/DL
RBC # BLD: 5.42 M/UL
RBC # FLD: 16 %
SODIUM SERPL-SCNC: 141 MMOL/L
TRIGL SERPL-MCNC: 219 MG/DL
TSH SERPL-ACNC: 1.19 UIU/ML
WBC # FLD AUTO: 7.1 K/UL

## 2023-04-17 ENCOUNTER — RX RENEWAL (OUTPATIENT)
Age: 73
End: 2023-04-17

## 2023-09-20 NOTE — ED ADULT NURSE NOTE - CAS DISCH TRANSFER METHOD
Cholecystectomy- Laparoscopic    Ways to Take Care of Yourself  Anesthesia  General precautions you should follow during the 24 hours after receiving any anesthesia (general epidural, spinal, local with sedation, or nerve block):   1. Rest today.  You may experience dizziness, drowsiness, or light-headedness.  Do not drive a motor vehicle, drink alcohol, operate machinery, or make any major decisions for the next 24 hours.   2. During this time a responsible adult needs to oversee your care.   3. If you experience nausea, do not eat or drink anything until nausea is gone.  After nausea is gone, take small amounts of water at first and then progress to other fluids and solid foods as tolerated.  If nausea and vomiting persists, notify your physician.  A sore throat can result from the breathing tube used to administer general anesthesia.  This usually lasts 1-2 days.  Cold liquids, ice chips, and throat lozenges help relieve the soreness.    Activity   1. Take short walks at least three times a day.  Rest when you get tired.   2. NO HEAVY LIFTING!  Do not lift over 15-20 pounds for 2-4 weeks.   3. Do ankle pumps and ankle circles throughout the day.    Medications:     1.   Please use acetaminophen and ibuprofen for first line agents for pain control. Appropriate dosages have been recommended in your discharge after visit summary. Narcotics have been prescribed that can be taken in addition to these medications but their use should be limited to pain not controlled with acetaminophen and ibuprofen     Diet   1. Normal diet unless doctor tells you otherwise.  Light foods today to decrease chance of nausea from anesthetic.   2. Drink 8-10 glasses of water/juices a day.   3. Eat fruits and vegetables to avoid constipation.  If constipation occurs, may take an over the counter laxative.  Take daily stool softeners if taking narcotic pain medication.     4. You might notice your stool is not as dark as before. This is  normal due to a lack of concentrated bile     5. Avoid fatty foods as they can cause diarrhea or loose stools.     Dressing/Incision   1. Keep dressing clean and dry.  May shower in 24 hours.  Replace the dressing daily for two days.  If the incision is dry after 2 days, may leave dressing off.   2. Keep an ice bag for comfort if needed on the incision for 48 hours and with any swelling.    Special Instructions   1. If you have Steri-Strips don't remove them until seen by MD at follow-up, but they may fall off on their own     2. If you have Dermabond don't pick it off. It will fall off on its own over the next few weeks.     Call Your Doctor if You Have Any of the Following (Red Flags):   1. Increasing pain, redness, swelling, or drainage from incision.   2. Fever over 101°F for longer than 24 hours.  Check your temperature twice a day.   3. Trouble breathing or chest pain.   4. Pain, redness, or swelling in your legs.   5. Nausea, vomiting, or diarrhea that lasts longer than 24 hours.   6. Unable to urinate.      Physician: Dr. Joaquin Garay   Office Phone: [phone]                                 Office Address: [address], [address]        [place] [address]     Private car

## 2023-10-04 ENCOUNTER — APPOINTMENT (OUTPATIENT)
Dept: CARDIOLOGY | Facility: CLINIC | Age: 73
End: 2023-10-04
Payer: MEDICARE

## 2023-10-04 ENCOUNTER — NON-APPOINTMENT (OUTPATIENT)
Age: 73
End: 2023-10-04

## 2023-10-04 VITALS
BODY MASS INDEX: 28.89 KG/M2 | WEIGHT: 157 LBS | HEIGHT: 62 IN | HEART RATE: 75 BPM | OXYGEN SATURATION: 97 % | DIASTOLIC BLOOD PRESSURE: 70 MMHG | SYSTOLIC BLOOD PRESSURE: 116 MMHG

## 2023-10-04 PROCEDURE — 93000 ELECTROCARDIOGRAM COMPLETE: CPT

## 2023-10-04 PROCEDURE — 99213 OFFICE O/P EST LOW 20 MIN: CPT

## 2024-04-10 ENCOUNTER — NON-APPOINTMENT (OUTPATIENT)
Age: 74
End: 2024-04-10

## 2024-04-10 ENCOUNTER — APPOINTMENT (OUTPATIENT)
Dept: CARDIOLOGY | Facility: CLINIC | Age: 74
End: 2024-04-10
Payer: MEDICARE

## 2024-04-10 VITALS
DIASTOLIC BLOOD PRESSURE: 70 MMHG | BODY MASS INDEX: 28.89 KG/M2 | SYSTOLIC BLOOD PRESSURE: 110 MMHG | HEART RATE: 86 BPM | HEIGHT: 62 IN | OXYGEN SATURATION: 95 % | WEIGHT: 157 LBS

## 2024-04-10 DIAGNOSIS — R07.9 CHEST PAIN, UNSPECIFIED: ICD-10-CM

## 2024-04-10 DIAGNOSIS — E78.5 HYPERLIPIDEMIA, UNSPECIFIED: ICD-10-CM

## 2024-04-10 PROCEDURE — 93000 ELECTROCARDIOGRAM COMPLETE: CPT

## 2024-04-10 PROCEDURE — 99213 OFFICE O/P EST LOW 20 MIN: CPT

## 2024-04-11 DIAGNOSIS — D50.8 OTHER IRON DEFICIENCY ANEMIAS: ICD-10-CM

## 2024-04-11 LAB
ALBUMIN SERPL ELPH-MCNC: 4.4 G/DL
ALP BLD-CCNC: 123 U/L
ALT SERPL-CCNC: 21 U/L
ANION GAP SERPL CALC-SCNC: 14 MMOL/L
AST SERPL-CCNC: 20 U/L
BILIRUB SERPL-MCNC: 0.4 MG/DL
BUN SERPL-MCNC: 13 MG/DL
CALCIUM SERPL-MCNC: 10.5 MG/DL
CHLORIDE SERPL-SCNC: 106 MMOL/L
CHOLEST SERPL-MCNC: 183 MG/DL
CO2 SERPL-SCNC: 22 MMOL/L
CREAT SERPL-MCNC: 0.72 MG/DL
EGFR: 89 ML/MIN/1.73M2
ESTIMATED AVERAGE GLUCOSE: 128 MG/DL
GLUCOSE SERPL-MCNC: 110 MG/DL
HBA1C MFR BLD HPLC: 6.1 %
HCT VFR BLD CALC: 43.4 %
HDLC SERPL-MCNC: 62 MG/DL
HGB BLD-MCNC: 13.2 G/DL
LDLC SERPL CALC-MCNC: 93 MG/DL
MCHC RBC-ENTMCNC: 26 PG
MCHC RBC-ENTMCNC: 30.4 GM/DL
MCV RBC AUTO: 85.4 FL
NONHDLC SERPL-MCNC: 121 MG/DL
PLATELET # BLD AUTO: 182 K/UL
POTASSIUM SERPL-SCNC: 4.3 MMOL/L
PROT SERPL-MCNC: 7.4 G/DL
RBC # BLD: 5.08 M/UL
RBC # FLD: 15.5 %
SODIUM SERPL-SCNC: 142 MMOL/L
TRIGL SERPL-MCNC: 165 MG/DL
TSH SERPL-ACNC: 1.21 UIU/ML
WBC # FLD AUTO: 7.49 K/UL

## 2024-04-11 NOTE — HISTORY OF PRESENT ILLNESS
[FreeTextEntry1] : 74 yo female followed for h/o HLD, no h/o ASHD otherwise.  Distant h/o TGA with no documented CVA. Jaymie denies any CP, SOB, dizziness. No interval changes since last visit.

## 2024-04-11 NOTE — CARDIOLOGY SUMMARY
[de-identified] : 4/10/24, Sinus  Rhythm , Low voltage in precordial leads.  -Poor R-wave progression -may be secondary to pulmonary disease   consider old anterior infarct.  3/29/23, Sinus  Rhythm , Low voltage in precordial leads.  -Poor R-wave progression -may be secondary to pulmonary disease   consider old anterior infarct.  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  [No Ischemia] : no Ischemia [No Exercise Ind Arr] : no exercise induced arrhythmias [No Symptoms] : no Symptoms [___] : [unfilled] [LVEF ___%] : LVEF [unfilled]%

## 2024-04-15 LAB
IRON SATN MFR SERPL: 9 %
IRON SERPL-MCNC: 31 UG/DL
TIBC SERPL-MCNC: 362 UG/DL
UIBC SERPL-MCNC: 331 UG/DL

## 2024-10-10 ENCOUNTER — APPOINTMENT (OUTPATIENT)
Dept: CARDIOLOGY | Facility: CLINIC | Age: 74
End: 2024-10-10
Payer: MEDICARE

## 2024-10-10 ENCOUNTER — NON-APPOINTMENT (OUTPATIENT)
Age: 74
End: 2024-10-10

## 2024-10-10 VITALS
BODY MASS INDEX: 28.71 KG/M2 | SYSTOLIC BLOOD PRESSURE: 124 MMHG | HEART RATE: 77 BPM | HEIGHT: 62 IN | DIASTOLIC BLOOD PRESSURE: 70 MMHG | OXYGEN SATURATION: 96 % | WEIGHT: 156 LBS

## 2024-10-10 DIAGNOSIS — D50.8 OTHER IRON DEFICIENCY ANEMIAS: ICD-10-CM

## 2024-10-10 DIAGNOSIS — G45.4 TRANSIENT GLOBAL AMNESIA: ICD-10-CM

## 2024-10-10 DIAGNOSIS — E78.5 HYPERLIPIDEMIA, UNSPECIFIED: ICD-10-CM

## 2024-10-10 PROCEDURE — 93000 ELECTROCARDIOGRAM COMPLETE: CPT

## 2024-10-10 PROCEDURE — 99213 OFFICE O/P EST LOW 20 MIN: CPT

## 2024-10-10 PROCEDURE — G2211 COMPLEX E/M VISIT ADD ON: CPT

## 2024-10-10 RX ORDER — FERROUS SULFATE 325(65) MG
325 (65 FE) TABLET ORAL
Refills: 0 | Status: ACTIVE | COMMUNITY

## 2024-10-13 LAB
HCT VFR BLD CALC: 46.7 %
HGB BLD-MCNC: 14.4 G/DL
IRON SATN MFR SERPL: 15 %
IRON SERPL-MCNC: 52 UG/DL
MCHC RBC-ENTMCNC: 26.3 PG
MCHC RBC-ENTMCNC: 30.8 GM/DL
MCV RBC AUTO: 85.2 FL
PLATELET # BLD AUTO: 150 K/UL
RBC # BLD: 5.48 M/UL
RBC # FLD: 15.3 %
TIBC SERPL-MCNC: 360 UG/DL
UIBC SERPL-MCNC: 307 UG/DL
WBC # FLD AUTO: 7.19 K/UL

## 2025-05-12 ENCOUNTER — NON-APPOINTMENT (OUTPATIENT)
Age: 75
End: 2025-05-12

## 2025-05-12 ENCOUNTER — APPOINTMENT (OUTPATIENT)
Dept: CARDIOLOGY | Facility: CLINIC | Age: 75
End: 2025-05-12
Payer: MEDICARE

## 2025-05-12 VITALS — HEART RATE: 82 BPM

## 2025-05-12 VITALS
HEART RATE: 82 BPM | HEIGHT: 62 IN | SYSTOLIC BLOOD PRESSURE: 120 MMHG | DIASTOLIC BLOOD PRESSURE: 70 MMHG | BODY MASS INDEX: 28.71 KG/M2 | WEIGHT: 156 LBS

## 2025-05-12 DIAGNOSIS — G45.4 TRANSIENT GLOBAL AMNESIA: ICD-10-CM

## 2025-05-12 DIAGNOSIS — D50.8 OTHER IRON DEFICIENCY ANEMIAS: ICD-10-CM

## 2025-05-12 PROCEDURE — 99214 OFFICE O/P EST MOD 30 MIN: CPT

## 2025-05-12 PROCEDURE — G2211 COMPLEX E/M VISIT ADD ON: CPT

## 2025-05-12 PROCEDURE — 36415 COLL VENOUS BLD VENIPUNCTURE: CPT

## 2025-05-12 PROCEDURE — 93000 ELECTROCARDIOGRAM COMPLETE: CPT

## 2025-05-13 LAB
ALBUMIN SERPL ELPH-MCNC: 4.6 G/DL
ALP BLD-CCNC: 115 U/L
ALT SERPL-CCNC: 30 U/L
ANION GAP SERPL CALC-SCNC: 12 MMOL/L
AST SERPL-CCNC: 28 U/L
BILIRUB SERPL-MCNC: 0.5 MG/DL
BUN SERPL-MCNC: 15 MG/DL
CALCIUM SERPL-MCNC: 10.7 MG/DL
CHLORIDE SERPL-SCNC: 104 MMOL/L
CHOLEST SERPL-MCNC: 197 MG/DL
CO2 SERPL-SCNC: 24 MMOL/L
CREAT SERPL-MCNC: 0.76 MG/DL
EGFRCR SERPLBLD CKD-EPI 2021: 83 ML/MIN/1.73M2
ESTIMATED AVERAGE GLUCOSE: 126 MG/DL
GLUCOSE SERPL-MCNC: 100 MG/DL
HBA1C MFR BLD HPLC: 6 %
HCT VFR BLD CALC: 48 %
HDLC SERPL-MCNC: 58 MG/DL
HGB BLD-MCNC: 14.5 G/DL
IRON SATN MFR SERPL: 14 %
IRON SERPL-MCNC: 53 UG/DL
LDLC SERPL-MCNC: 110 MG/DL
MCHC RBC-ENTMCNC: 26.5 PG
MCHC RBC-ENTMCNC: 30.2 G/DL
MCV RBC AUTO: 87.8 FL
NONHDLC SERPL-MCNC: 138 MG/DL
PLATELET # BLD AUTO: 184 K/UL
POTASSIUM SERPL-SCNC: 5.1 MMOL/L
PROT SERPL-MCNC: 7.7 G/DL
RBC # BLD: 5.47 M/UL
RBC # FLD: 15.7 %
SODIUM SERPL-SCNC: 139 MMOL/L
TIBC SERPL-MCNC: 374 UG/DL
TRIGL SERPL-MCNC: 164 MG/DL
TSH SERPL-ACNC: 1.49 UIU/ML
UIBC SERPL-MCNC: 321 UG/DL
WBC # FLD AUTO: 7.01 K/UL

## 2025-05-15 ENCOUNTER — NON-APPOINTMENT (OUTPATIENT)
Age: 75
End: 2025-05-15

## 2025-05-15 DIAGNOSIS — E78.5 HYPERLIPIDEMIA, UNSPECIFIED: ICD-10-CM
